# Patient Record
Sex: FEMALE | Race: WHITE | Employment: PART TIME | URBAN - METROPOLITAN AREA
[De-identification: names, ages, dates, MRNs, and addresses within clinical notes are randomized per-mention and may not be internally consistent; named-entity substitution may affect disease eponyms.]

---

## 2019-02-15 ENCOUNTER — OFFICE VISIT (OUTPATIENT)
Dept: PRIMARY CARE CLINIC | Age: 59
End: 2019-02-15
Payer: COMMERCIAL

## 2019-02-15 VITALS
OXYGEN SATURATION: 99 % | HEIGHT: 62 IN | SYSTOLIC BLOOD PRESSURE: 116 MMHG | BODY MASS INDEX: 31.76 KG/M2 | HEART RATE: 69 BPM | WEIGHT: 172.6 LBS | DIASTOLIC BLOOD PRESSURE: 80 MMHG

## 2019-02-15 DIAGNOSIS — Z12.39 BREAST CANCER SCREENING: ICD-10-CM

## 2019-02-15 DIAGNOSIS — Z00.00 ENCOUNTER FOR WELLNESS EXAMINATION: Primary | ICD-10-CM

## 2019-02-15 DIAGNOSIS — E66.09 CLASS 1 OBESITY DUE TO EXCESS CALORIES WITHOUT SERIOUS COMORBIDITY WITH BODY MASS INDEX (BMI) OF 31.0 TO 31.9 IN ADULT: ICD-10-CM

## 2019-02-15 PROCEDURE — 99396 PREV VISIT EST AGE 40-64: CPT | Performed by: INTERNAL MEDICINE

## 2019-02-15 ASSESSMENT — PATIENT HEALTH QUESTIONNAIRE - PHQ9
SUM OF ALL RESPONSES TO PHQ QUESTIONS 1-9: 0
2. FEELING DOWN, DEPRESSED OR HOPELESS: 0
SUM OF ALL RESPONSES TO PHQ QUESTIONS 1-9: 0
1. LITTLE INTEREST OR PLEASURE IN DOING THINGS: 0
SUM OF ALL RESPONSES TO PHQ9 QUESTIONS 1 & 2: 0

## 2019-02-15 ASSESSMENT — ENCOUNTER SYMPTOMS
VOMITING: 0
SINUS PRESSURE: 0
CONSTIPATION: 0
COUGH: 0
ABDOMINAL DISTENTION: 0
BACK PAIN: 0
NAUSEA: 0
SHORTNESS OF BREATH: 0
WHEEZING: 0
ABDOMINAL PAIN: 0
SINUS PAIN: 0
DIARRHEA: 0

## 2019-05-14 ENCOUNTER — TELEPHONE (OUTPATIENT)
Dept: PRIMARY CARE CLINIC | Age: 59
End: 2019-05-14

## 2019-05-14 NOTE — TELEPHONE ENCOUNTER
Telephone Outcome: Other - patient's insurance does not cover mammograms. I gave her the number to [facility] to discuss with them options she might have. Isaías Nguyen

## 2019-05-15 ENCOUNTER — OFFICE VISIT (OUTPATIENT)
Dept: PRIMARY CARE CLINIC | Age: 59
End: 2019-05-15
Payer: COMMERCIAL

## 2019-05-15 ENCOUNTER — HOSPITAL ENCOUNTER (OUTPATIENT)
Age: 59
Setting detail: SPECIMEN
Discharge: HOME | End: 2019-05-15
Payer: COMMERCIAL

## 2019-05-15 VITALS
HEART RATE: 61 BPM | BODY MASS INDEX: 31.83 KG/M2 | DIASTOLIC BLOOD PRESSURE: 68 MMHG | OXYGEN SATURATION: 98 % | WEIGHT: 173 LBS | HEIGHT: 62 IN | SYSTOLIC BLOOD PRESSURE: 114 MMHG

## 2019-05-15 DIAGNOSIS — N89.8 VAGINAL DRYNESS: ICD-10-CM

## 2019-05-15 DIAGNOSIS — Z01.419 WELL WOMAN EXAM: Primary | ICD-10-CM

## 2019-05-15 RX ORDER — ESTRADIOL 0.1 MG/G
1 CREAM VAGINAL DAILY
Qty: 1 TUBE | Refills: 3 | Status: SHIPPED | OUTPATIENT
Start: 2019-05-15 | End: 2019-12-18 | Stop reason: ALTCHOICE

## 2019-05-15 ASSESSMENT — PATIENT HEALTH QUESTIONNAIRE - PHQ9
SUM OF ALL RESPONSES TO PHQ QUESTIONS 1-9: 0
1. LITTLE INTEREST OR PLEASURE IN DOING THINGS: 0
2. FEELING DOWN, DEPRESSED OR HOPELESS: 0
SUM OF ALL RESPONSES TO PHQ QUESTIONS 1-9: 0
SUM OF ALL RESPONSES TO PHQ9 QUESTIONS 1 & 2: 0

## 2019-05-15 NOTE — PROGRESS NOTES
[facility]  [address] [place] [address] Dr  [address]  [place] [address]  Dept: [phone]  Dept Fax: [phone]    Carlos A Ontiveros is a 60 y.o. female who presents today for her medical conditions/complaints as noted below. Chief Complaint   Patient presents with    Gynecologic Exam       HPI:     This is a 69-year-old female who is here for a Pap smear. Ileus Pap smear was normal.  She does not have any abnormal discharge or any other abnormal bleeding. She is postmenopausal.      No results found for: LABA1C          ( goal A1C is < 7)   No results found for: LABMICR  LDL Cholesterol (mg/dL)   Date Value   03/15/2016 106   2014 103   2011 98       (goal LDL is <100)   AST (U/L)   Date Value   03/15/2016 14     ALT (U/L)   Date Value   03/15/2016 14     BUN (mg/dL)   Date Value   2016 12     BP Readings from Last 3 Encounters:   05/15/19 114/68   02/15/19 116/80   16 125/67          (goal 120/80)    Past Medical History:   Diagnosis Date    Dermatitis     MVP (mitral valve prolapse)     Wears glasses       Past Surgical History:   Procedure Laterality Date     SECTION      TONSILLECTOMY AND ADENOIDECTOMY      VAGINOPLASTY         Family History   Problem Relation Age of Onset    Heart Disease Mother     Lupus Mother     Lung Cancer Father     Hypertension Father     Emphysema Father     Lupus Sister     Hypertension Brother     COPD Brother     Diabetes Brother     Stroke Maternal Grandmother        Social History     Tobacco Use    Smoking status: Never Smoker    Smokeless tobacco: Never Used   Substance Use Topics    Alcohol use:  Yes     Alcohol/week: 3.6 oz     Types: 6 Standard drinks or equivalent per week      Current Outpatient Medications   Medication Sig Dispense Refill    estradiol (ESTRACE VAGINAL) 0.1 MG/GM vaginal cream Place 1 g vaginally daily 1 Tube 3    Ibuprofen (ADVIL PO) Take 600 mg by mouth 2 times daily.  MOVIPREP 100 G solution   0     No current facility-administered medications for this visit. Allergies   Allergen Reactions    Demerol Hcl [Meperidine]     Tetracyclines & Related        Health Maintenance   Topic Date Due    Shingles Vaccine (1 of 2) 05/02/2009    Breast cancer screen  02/03/2018    Cervical cancer screen  05/01/2018    Diabetes screen  05/15/2020 (Originally 5/2/1999)    DTaP/Tdap/Td vaccine (2 - Tdap) 10/01/2020    Lipid screen  03/15/2021    Colon cancer screen colonoscopy  05/14/2024    Flu vaccine  Completed    Pneumococcal 0-64 years Vaccine  Aged Out    Hepatitis C screen  Discontinued    HIV screen  Discontinued       Subjective:      Review of Systems   Constitutional: Negative for activity change, appetite change, chills, fatigue and fever. HENT: Negative for congestion, ear pain, hearing loss, nosebleeds, sinus pressure, sinus pain and sneezing. Eyes: Negative for visual disturbance. Respiratory: Negative for cough, shortness of breath and wheezing. Cardiovascular: Negative for chest pain and palpitations. Gastrointestinal: Negative for abdominal distention, abdominal pain, constipation, diarrhea, nausea and vomiting. Endocrine: Negative for cold intolerance, heat intolerance, polydipsia, polyphagia and polyuria. Genitourinary: Negative for difficulty urinating, menstrual problem, vaginal bleeding and vaginal discharge. Musculoskeletal: Negative for back pain and joint swelling. Skin: Negative for rash. Neurological: Negative for numbness and headaches. Psychiatric/Behavioral: Negative for sleep disturbance. The patient is not nervous/anxious. Objective:     Physical Exam   Constitutional: She is oriented to person, place, and time. Vital signs are normal. She appears well-developed and well-nourished. She is active. No distress. HENT:   Head: Normocephalic and atraumatic.    Right Ear: Hearing normal.   Left Ear: Hearing normal.   Mouth/Throat: Uvula is midline, oropharynx is clear and moist and mucous membranes are normal.   Eyes: Pupils are equal, round, and reactive to light. Conjunctivae are normal. No scleral icterus. Neck: Normal range of motion, full passive range of motion without pain and phonation normal. Neck supple. No JVD present. No thyroid mass and no thyromegaly present. Cardiovascular: Normal rate, regular rhythm, normal heart sounds and intact distal pulses. Exam reveals no decreased pulses. No murmur heard. Pulses:       Carotid pulses are 2+ on the right side, and 2+ on the left side. Radial pulses are 2+ on the right side, and 2+ on the left side. Pulmonary/Chest: Effort normal and breath sounds normal. No accessory muscle usage. No apnea. No respiratory distress. She has no wheezes. She has no rales. Abdominal: Soft. Bowel sounds are normal. She exhibits no distension. There is no tenderness. Genitourinary: Vagina normal. Cervix exhibits no motion tenderness, no discharge and no friability. Right adnexum displays no mass, no tenderness and no fullness. Left adnexum displays no mass, no tenderness and no fullness. Musculoskeletal: Normal range of motion. She exhibits no edema or deformity. Lymphadenopathy:     She has no cervical adenopathy. Neurological: She is alert and oriented to person, place, and time. She displays normal reflexes. Skin: Skin is warm and intact. Capillary refill takes less than 2 seconds. No rash noted. She is not diaphoretic. Psychiatric: She has a normal mood and affect. Her behavior is normal. Cognition and memory are normal.   Nursing note and vitals reviewed. /68   Pulse 61   Ht 5' 2\" (1.575 m)   Wt 173 lb (78.5 kg)   LMP 01/01/1999 (Approximate)   SpO2 98%   BMI 31.64 kg/m²     Assessment:          1. Well woman exam  - PAP SMEAR; Future    2.  Vaginal dryness  - estradiol (ESTRACE VAGINAL) 0.1 MG/GM vaginal cream; Place 1 g vaginally daily  Dispense: 1 Tube; Refill: 3            Diagnosis Orders   1. Well woman exam  PAP SMEAR   2. Vaginal dryness  estradiol (ESTRACE VAGINAL) 0.1 MG/GM vaginal cream           Plan:      No follow-ups on file. Orders Placed This Encounter   Procedures    PAP SMEAR     Patient History:    Patient's last menstrual period was 1999 (approximate). OBGYN Status: Postmenopausal  Past Surgical History:  No date:  SECTION  No date: TONSILLECTOMY AND ADENOIDECTOMY  No date: VAGINOPLASTY      Social History    Tobacco Use      Smoking status: Never Smoker      Smokeless tobacco: Never Used       Standing Status:   Future     Standing Expiration Date:   2020     Order Specific Question:   Collection Type     Answer: Thin Prep     Order Specific Question:   Prior Abnormal Pap Test     Answer:   No     Order Specific Question:   Screening or Diagnostic     Answer:   Screening     Order Specific Question:   HPV Requested? Answer:   Yes -  If ASCUS Reflex HPV     Order Specific Question:   High Risk Patient     Answer:   N/A     Orders Placed This Encounter   Medications    estradiol (ESTRACE VAGINAL) 0.1 MG/GM vaginal cream     Sig: Place 1 g vaginally daily     Dispense:  1 Tube     Refill:  3         Patient given educational materials - see patient instructions. Discussed use, benefit, and side effects of prescribedmedications. All patient questions answered. Pt voiced understanding. Reviewed health maintenance. Instructed to continue current medications, diet and exercise. Patient agreed with treatment plan. Follow up as directed. Of the given duration appointment visit, Dr. Lito Chaudhari MD  spent at least 50% of the face-to-face time in counseling, explanation of diagnosis, planning of further management, and answering all questions.       Electronically signed by Lito Chaudhari MD on 2019 at 10:44 AM      Please note that this chart was generated using voice recognition Dragon dictation software. Although every effort was made to ensure the accuracy of this automatedtranscription, some errors in transcription may have occurred.

## 2019-05-17 ASSESSMENT — ENCOUNTER SYMPTOMS
ABDOMINAL DISTENTION: 0
COUGH: 0
SINUS PAIN: 0
BACK PAIN: 0
SINUS PRESSURE: 0
WHEEZING: 0
SHORTNESS OF BREATH: 0
ABDOMINAL PAIN: 0
CONSTIPATION: 0
DIARRHEA: 0
NAUSEA: 0
VOMITING: 0

## 2019-05-20 LAB — CYTOLOGY REPORT: NORMAL

## 2019-07-18 ENCOUNTER — TELEPHONE (OUTPATIENT)
Dept: PRIMARY CARE CLINIC | Age: 59
End: 2019-07-18

## 2019-08-09 ENCOUNTER — TELEPHONE (OUTPATIENT)
Dept: SURGERY | Age: 59
End: 2019-08-09

## 2019-12-18 ENCOUNTER — OFFICE VISIT (OUTPATIENT)
Dept: PRIMARY CARE CLINIC | Age: 59
End: 2019-12-18
Payer: COMMERCIAL

## 2019-12-18 VITALS
SYSTOLIC BLOOD PRESSURE: 136 MMHG | HEART RATE: 69 BPM | OXYGEN SATURATION: 99 % | WEIGHT: 174.6 LBS | BODY MASS INDEX: 31.93 KG/M2 | RESPIRATION RATE: 16 BRPM | DIASTOLIC BLOOD PRESSURE: 80 MMHG

## 2019-12-18 DIAGNOSIS — Z00.00 ANNUAL PHYSICAL EXAM: Primary | ICD-10-CM

## 2019-12-18 DIAGNOSIS — I45.9 SKIPPED HEART BEATS: ICD-10-CM

## 2019-12-18 DIAGNOSIS — E66.09 CLASS 1 OBESITY DUE TO EXCESS CALORIES WITHOUT SERIOUS COMORBIDITY WITH BODY MASS INDEX (BMI) OF 31.0 TO 31.9 IN ADULT: ICD-10-CM

## 2019-12-18 DIAGNOSIS — I34.1 MVP (MITRAL VALVE PROLAPSE): ICD-10-CM

## 2019-12-18 PROCEDURE — 99999 EKG 12-LEAD: CPT | Performed by: INTERNAL MEDICINE

## 2019-12-18 PROCEDURE — 99396 PREV VISIT EST AGE 40-64: CPT | Performed by: INTERNAL MEDICINE

## 2019-12-22 ASSESSMENT — ENCOUNTER SYMPTOMS
ABDOMINAL DISTENTION: 0
BACK PAIN: 0
CONSTIPATION: 0
SHORTNESS OF BREATH: 0
COUGH: 0
SINUS PAIN: 0
NAUSEA: 0
ABDOMINAL PAIN: 0
VOMITING: 0
SINUS PRESSURE: 0
WHEEZING: 0
DIARRHEA: 0

## 2020-01-06 ENCOUNTER — HOSPITAL ENCOUNTER (OUTPATIENT)
Age: 60
Discharge: HOME | End: 2020-01-06
Payer: COMMERCIAL

## 2020-01-06 ENCOUNTER — HOSPITAL ENCOUNTER (OUTPATIENT)
Dept: NON INVASIVE DIAGNOSTICS | Age: 60
Discharge: HOME | End: 2020-01-06
Payer: COMMERCIAL

## 2020-01-06 LAB
ABSOLUTE EOS #: 0.2 K/UL (ref 0–0.4)
ABSOLUTE IMMATURE GRANULOCYTE: ABNORMAL K/UL (ref 0–0.3)
ABSOLUTE LYMPH #: 1 K/UL (ref 1–4.8)
ABSOLUTE MONO #: 0.4 K/UL (ref 0.1–1.3)
ALBUMIN SERPL-MCNC: 4.1 G/DL (ref 3.5–5.2)
ALBUMIN/GLOBULIN RATIO: ABNORMAL (ref 1–2.5)
ALP BLD-CCNC: 66 U/L (ref 35–104)
ALT SERPL-CCNC: 11 U/L (ref 5–33)
ANION GAP SERPL CALCULATED.3IONS-SCNC: 9 MMOL/L (ref 9–17)
AST SERPL-CCNC: 13 U/L
BASOPHILS # BLD: 1 % (ref 0–2)
BASOPHILS ABSOLUTE: 0 K/UL (ref 0–0.2)
BILIRUB SERPL-MCNC: 0.27 MG/DL (ref 0.3–1.2)
BUN BLDV-MCNC: 16 MG/DL (ref 8–23)
BUN/CREAT BLD: ABNORMAL (ref 9–20)
CALCIUM SERPL-MCNC: 9.7 MG/DL (ref 8.6–10.4)
CHLORIDE BLD-SCNC: 110 MMOL/L (ref 98–107)
CHOLESTEROL/HDL RATIO: 3.3
CHOLESTEROL: 140 MG/DL
CO2: 23 MMOL/L (ref 20–31)
CREAT SERPL-MCNC: 0.59 MG/DL (ref 0.5–0.9)
DIFFERENTIAL TYPE: ABNORMAL
EOSINOPHILS RELATIVE PERCENT: 4 % (ref 0–4)
ESTIMATED AVERAGE GLUCOSE: 114 MG/DL
FOLATE: 13 NG/ML
GFR AFRICAN AMERICAN: >60 ML/MIN
GFR NON-AFRICAN AMERICAN: >60 ML/MIN
GFR SERPL CREATININE-BSD FRML MDRD: ABNORMAL ML/MIN/{1.73_M2}
GFR SERPL CREATININE-BSD FRML MDRD: ABNORMAL ML/MIN/{1.73_M2}
GLUCOSE BLD-MCNC: 97 MG/DL (ref 70–99)
HBA1C MFR BLD: 5.6 % (ref 4–6)
HCT VFR BLD CALC: 40.2 % (ref 36–46)
HDLC SERPL-MCNC: 43 MG/DL
HEMOGLOBIN: 13.5 G/DL (ref 12–16)
IMMATURE GRANULOCYTES: ABNORMAL %
LDL CHOLESTEROL: 83 MG/DL (ref 0–130)
LV EF: 63 %
LVEF MODALITY: NORMAL
LYMPHOCYTES # BLD: 23 % (ref 24–44)
MCH RBC QN AUTO: 30.2 PG (ref 26–34)
MCHC RBC AUTO-ENTMCNC: 33.5 G/DL (ref 31–37)
MCV RBC AUTO: 90 FL (ref 80–100)
MONOCYTES # BLD: 10 % (ref 1–7)
NRBC AUTOMATED: ABNORMAL PER 100 WBC
PDW BLD-RTO: 13.3 % (ref 11.5–14.9)
PLATELET # BLD: 222 K/UL (ref 150–450)
PLATELET ESTIMATE: ABNORMAL
PMV BLD AUTO: 9 FL (ref 6–12)
POTASSIUM SERPL-SCNC: 4.3 MMOL/L (ref 3.7–5.3)
RBC # BLD: 4.47 M/UL (ref 4–5.2)
RBC # BLD: ABNORMAL 10*6/UL
SEG NEUTROPHILS: 62 % (ref 36–66)
SEGMENTED NEUTROPHILS ABSOLUTE COUNT: 2.6 K/UL (ref 1.3–9.1)
SODIUM BLD-SCNC: 142 MMOL/L (ref 135–144)
TOTAL PROTEIN: 6.8 G/DL (ref 6.4–8.3)
TRIGL SERPL-MCNC: 70 MG/DL
TSH SERPL DL<=0.05 MIU/L-ACNC: 1.08 MIU/L (ref 0.3–5)
VITAMIN B-12: 233 PG/ML (ref 232–1245)
VITAMIN D 25-HYDROXY: 17.5 NG/ML (ref 30–100)
VLDLC SERPL CALC-MCNC: NORMAL MG/DL (ref 1–30)
WBC # BLD: 4.2 K/UL (ref 3.5–11)
WBC # BLD: ABNORMAL 10*3/UL

## 2020-01-06 PROCEDURE — 83036 HEMOGLOBIN GLYCOSYLATED A1C: CPT

## 2020-01-06 PROCEDURE — 85025 COMPLETE CBC W/AUTO DIFF WBC: CPT

## 2020-01-06 PROCEDURE — 36415 COLL VENOUS BLD VENIPUNCTURE: CPT

## 2020-01-06 PROCEDURE — 82746 ASSAY OF FOLIC ACID SERUM: CPT

## 2020-01-06 PROCEDURE — 84443 ASSAY THYROID STIM HORMONE: CPT

## 2020-01-06 PROCEDURE — 82607 VITAMIN B-12: CPT

## 2020-01-06 PROCEDURE — 82306 VITAMIN D 25 HYDROXY: CPT

## 2020-01-06 PROCEDURE — 93306 TTE W/DOPPLER COMPLETE: CPT

## 2020-01-06 PROCEDURE — 80061 LIPID PANEL: CPT

## 2020-01-06 PROCEDURE — 80053 COMPREHEN METABOLIC PANEL: CPT

## 2020-01-07 RX ORDER — ERGOCALCIFEROL 1.25 MG/1
50000 CAPSULE ORAL WEEKLY
Qty: 12 CAPSULE | Refills: 1 | Status: SHIPPED | OUTPATIENT
Start: 2020-01-07 | End: 2022-07-12

## 2020-07-07 ENCOUNTER — OFFICE VISIT (OUTPATIENT)
Dept: PRIMARY CARE CLINIC | Age: 60
End: 2020-07-07
Payer: COMMERCIAL

## 2020-07-07 VITALS
DIASTOLIC BLOOD PRESSURE: 72 MMHG | TEMPERATURE: 98.4 F | SYSTOLIC BLOOD PRESSURE: 122 MMHG | BODY MASS INDEX: 31.1 KG/M2 | WEIGHT: 169 LBS | HEIGHT: 62 IN | OXYGEN SATURATION: 98 % | RESPIRATION RATE: 16 BRPM | HEART RATE: 67 BPM

## 2020-07-07 PROCEDURE — 99213 OFFICE O/P EST LOW 20 MIN: CPT | Performed by: INTERNAL MEDICINE

## 2020-07-07 RX ORDER — ACETAMINOPHEN 500 MG
1000 TABLET ORAL 2 TIMES DAILY PRN
COMMUNITY
End: 2022-07-12

## 2020-07-07 ASSESSMENT — PATIENT HEALTH QUESTIONNAIRE - PHQ9
SUM OF ALL RESPONSES TO PHQ QUESTIONS 1-9: 0
SUM OF ALL RESPONSES TO PHQ9 QUESTIONS 1 & 2: 0
1. LITTLE INTEREST OR PLEASURE IN DOING THINGS: 0
SUM OF ALL RESPONSES TO PHQ QUESTIONS 1-9: 0
2. FEELING DOWN, DEPRESSED OR HOPELESS: 0

## 2020-07-15 ENCOUNTER — NURSE ONLY (OUTPATIENT)
Dept: PRIMARY CARE CLINIC | Age: 60
End: 2020-07-15
Payer: COMMERCIAL

## 2020-07-15 PROCEDURE — 96372 THER/PROPH/DIAG INJ SC/IM: CPT | Performed by: INTERNAL MEDICINE

## 2020-07-15 RX ORDER — CYANOCOBALAMIN 1000 UG/ML
1000 INJECTION INTRAMUSCULAR; SUBCUTANEOUS ONCE
Status: COMPLETED | OUTPATIENT
Start: 2020-07-15 | End: 2020-07-15

## 2020-07-15 RX ADMIN — CYANOCOBALAMIN 1000 MCG: 1000 INJECTION INTRAMUSCULAR; SUBCUTANEOUS at 08:17

## 2020-07-15 NOTE — PROGRESS NOTES
After obtaining consent, and per orders of Dr. Swetha Avina, injection of B12 given in Left deltoid by Wojciech Soler. Patient instructed to report any adverse reaction to the office immediately. Patient tolerated injection & left the office a few minutes later feeling well. Double checked correct medication was to be given with DUKE Esparza.

## 2020-07-21 ASSESSMENT — ENCOUNTER SYMPTOMS
CONSTIPATION: 0
WHEEZING: 0
DIARRHEA: 0
SINUS PAIN: 0
SINUS PRESSURE: 0
ABDOMINAL PAIN: 0
SHORTNESS OF BREATH: 0
NAUSEA: 0
ABDOMINAL DISTENTION: 0
COUGH: 0
BACK PAIN: 0
VOMITING: 0

## 2020-07-21 NOTE — PROGRESS NOTES
[address] PRIMARY CARE  [address] [facility]  [address] [address]  Dept: [phone]  Dept Fax: [phone]    Sheri Jolly is a 63 y.o. female who presents today for her medical conditions/complaints as noted below. Chief Complaint   Patient presents with    Ankle Pain     right sided, x 1.5 years       HPI:     This is a 69-year-old female who is here for chronic pain of her right ankle which has been there for many years about 1.5 years. She had injury to that ankle few years ago. Advised her to take NSAIDs and do physical therapy. No other complaints or concerns      Hemoglobin A1C (%)   Date Value   2020 5.6             ( goal A1C is < 7)   No results found for: LABMICR  LDL Cholesterol (mg/dL)   Date Value   2020 83   03/15/2016 106   2014 103       (goal LDL is <100)   AST (U/L)   Date Value   2020 13     ALT (U/L)   Date Value   2020 11     BUN (mg/dL)   Date Value   2020 16     BP Readings from Last 3 Encounters:   20 122/72   19 136/80   05/15/19 114/68          (goal 120/80)    Past Medical History:   Diagnosis Date    Dermatitis     MVP (mitral valve prolapse)     Wears glasses       Past Surgical History:   Procedure Laterality Date     SECTION      TONSILLECTOMY AND ADENOIDECTOMY      VAGINOPLASTY         Family History   Problem Relation Age of Onset    Heart Disease Mother     Lupus Mother     Lung Cancer Father     Hypertension Father     Emphysema Father     Lupus Sister     Hypertension Brother     COPD Brother     Diabetes Brother     Stroke Maternal Grandmother        Social History     Tobacco Use    Smoking status: Never Smoker    Smokeless tobacco: Never Used   Substance Use Topics    Alcohol use:  Yes     Alcohol/week: 6.0 standard drinks     Types: 6 Standard drinks or equivalent per week      Current Outpatient Medications   Medication Sig Dispense Refill nervous/anxious. All other systems reviewed and are negative. Objective:     Physical Exam  Vitals signs and nursing note reviewed. Constitutional:       General: She is not in acute distress. Appearance: She is well-developed. She is not diaphoretic. HENT:      Head: Normocephalic and atraumatic. Right Ear: Hearing normal.      Left Ear: Hearing normal.      Mouth/Throat:      Pharynx: Uvula midline. Eyes:      General: No scleral icterus. Conjunctiva/sclera: Conjunctivae normal.      Pupils: Pupils are equal, round, and reactive to light. Neck:      Musculoskeletal: Full passive range of motion without pain, normal range of motion and neck supple. Thyroid: No thyroid mass or thyromegaly. Vascular: No JVD. Trachea: Phonation normal.   Cardiovascular:      Rate and Rhythm: Normal rate and regular rhythm. Pulses: No decreased pulses. Carotid pulses are 2+ on the right side and 2+ on the left side. Radial pulses are 2+ on the right side and 2+ on the left side. Heart sounds: Normal heart sounds. No murmur. Pulmonary:      Effort: Pulmonary effort is normal. No accessory muscle usage or respiratory distress. Breath sounds: Normal breath sounds. No wheezing or rales. Abdominal:      General: Bowel sounds are normal. There is no distension. Palpations: Abdomen is soft. Tenderness: There is no abdominal tenderness. Musculoskeletal: Normal range of motion. General: No swelling, tenderness or deformity. Lymphadenopathy:      Cervical: No cervical adenopathy. Skin:     General: Skin is warm. Capillary Refill: Capillary refill takes less than 2 seconds. Findings: No rash. Neurological:      Mental Status: She is alert and oriented to person, place, and time.       Deep Tendon Reflexes: Reflexes normal.   Psychiatric:         Behavior: Behavior normal.       /72   Pulse 67   Temp 98.4 °F (36.9 °C) (Temporal)   Resp 16   Ht 5' 2\" (1.575 m)   Wt 169 lb (76.7 kg)   LMP 01/01/1999 (Approximate)   SpO2 98%   BMI 30.91 kg/m²     Assessment:          1. Chronic pain of right ankle  NSAIDs Tylenol thousand milligrams up to 3 times a day  - Ambulatory referral to Physical Therapy            Diagnosis Orders   1. Chronic pain of right ankle  Ambulatory referral to Physical Therapy           Plan:      No follow-ups on file. Orders Placed This Encounter   Procedures    Ambulatory referral to Physical Therapy     Referral Priority:   Routine     Referral Type:   Eval and Treat     Referral Reason:   Specialty Services Required     Requested Specialty:   Physical Therapy     Number of Visits Requested:   1     No orders of the defined types were placed in this encounter. Patient given educational materials - see patient instructions. Discussed use, benefit, and side effects of prescribedmedications. All patient questions answered. Pt voiced understanding. Reviewed health maintenance. Instructed to continue current medications, diet and exercise. Patient agreed with treatment plan. Follow up as directed. I spent a total of 15 minutes face to face with this patient. Over 50% of that time was spent on counseling and care coordination. Please see assessment and plan for details. Electronically signed by Marshall Matthews MD on 7/21/2020 at 6:31 PM      Please note that this chart was generated using voice recognition Dragon dictation software. Although every effort was made to ensure the accuracy of this automatedtranscription, some errors in transcription may have occurred.

## 2020-07-22 ENCOUNTER — NURSE ONLY (OUTPATIENT)
Dept: PRIMARY CARE CLINIC | Age: 60
End: 2020-07-22
Payer: COMMERCIAL

## 2020-07-22 PROCEDURE — 96372 THER/PROPH/DIAG INJ SC/IM: CPT | Performed by: INTERNAL MEDICINE

## 2020-07-22 RX ORDER — CYANOCOBALAMIN 1000 UG/ML
1000 INJECTION INTRAMUSCULAR; SUBCUTANEOUS ONCE
Status: COMPLETED | OUTPATIENT
Start: 2020-07-22 | End: 2020-07-22

## 2020-07-22 RX ADMIN — CYANOCOBALAMIN 1000 MCG: 1000 INJECTION INTRAMUSCULAR; SUBCUTANEOUS at 08:23

## 2020-07-27 ENCOUNTER — HOSPITAL ENCOUNTER (OUTPATIENT)
Dept: PHYSICAL THERAPY | Facility: CLINIC | Age: 60
Setting detail: THERAPIES SERIES
Discharge: HOME | End: 2020-07-27
Payer: COMMERCIAL

## 2020-07-27 PROCEDURE — 97110 THERAPEUTIC EXERCISES: CPT

## 2020-07-27 PROCEDURE — 97161 PT EVAL LOW COMPLEX 20 MIN: CPT

## 2020-07-27 NOTE — CONSULTS
tight Right tight   Hip flexor [] [x] [x]   quad [] [x] [x]   HS [] [] []   piriformis [] [x] [x]   ITB [] [] []   gastroc [] [x] [x]   Soleus  [] [x] [x]   Posterior Tib [] [] [x]   Peroneals [] [] []      TTP   Anterior talus, mild sinus tarsi, pos tib, talar navicular jt and talar calcaneal jt   Callus Pattern   Lateral met   Sensation [] []    Edema [] []    Neurological [] []     [] []     [] []    Gait [] [] Analysis: see above         TESTS (+/-) Left Right Not Tested   Ant. Drawer   [x]   Post. Drawer   [x]   Varus stress    [x]   Valgus Stress    [x]   Tinel's Sign   [x]   Talor Tilt   [x]   Syndesmosis Squeeze Test   [x]     Foot/Ankle Mobility  Left  Right    Talocrural Jt wfl hypo    Subtalar Jt EV IV    1st MTP Jt       Forefoot  Mild hypo    Midfoot           FUNCTION Normal Difficult Unable   Sitting [] [] []   Standing [] [x] []   Ambulation [] [x] []   Groom/Dress [] [] []   Lift/Carry [] [] []   Stairs [] [x] []   Bending [] [] []   Squat [] [] []   Kneel [] [] []     Comments:Patient presents to physical therapy c/o R anterior ankle pain. Patient demonstrates gastroc equinus,with intrinsic mm weakness, and compensatory foot mechanics while ambulating, limited hip ROM and glute medius weakness      Assessment:      STG: (to be met in 9 treatments)  1. ? Pain: Patient to report 0/10 pain with WB  2. ? ROM: Patient to demonstrate 0 degrees R TCJ DF with knee extended  3. ? Strength: Patient to demonstrate the ability to double heel raise with neutral pattern  4. ? Function: Patient to demonstrate the ability to ambulate with neutral gait pattern push off through great toe   5. Independent with Home Exercise Programs  6.  Demonstrate Knowledge of fall prevention              Patient goals: to eliminate foot pain    Rehab Potential:  [x] Good  [] Fair  [] Poor   Suggested Professional Referral:  [x] No  [] Yes:  Barriers to Goal Achievement[de-identified]  [x] No  [] Yes:  Domestic Concerns:  [x] No  [] Yes:    Pt. Education:  [x] Plans/Goals, Risks/Benefits discussed  [x] Home exercise program    Method of Education: [] Verbal  [] Demo  [] Written  Comprehension of Education:  [] Verbalizes understanding. [] Demonstrates understanding. [] Needs Review. [] Demonstrates/verbalizes understanding of HEP/Ed previously given. Treatment Plan:  [x] Therapeutic Exercise    [] Aquatic Therapy   [x] Manual Therapy     [] Electrical Stimulation  [x] Instruction in HEP      [] Lumbar/Cervical Traction  [x] Neuromuscular Re-education [] Cold/hotpack  [] Iontophoresis: 4 mg/mL  [x] Vasocompression (GameReady)                    Dexamethasone Sodium  [x] Gait Training             Phosphate 40-80 mAmin         []  Medication allergies reviewed for use of    Dexamethasone Sodium Phosphate 4mg/ml     with iontophoresis treatments. Pt is not allergic.     Frequency:  1 x/week for 9 visits    Todays Treatment:    Exercises:  Exercise     Reps/ Time Weight/ Level Comments   Posture edu      Static gastroc stretch      Dynamic gastroc stretch                        Other:    Specific Instructions for next treatment: STR to gastroc TrP 1&2, posterior tib, talar MOBs I/s in short foot, toe yoga, bilateral heel raise, review HEP    Evaluation Complexity:  History (Personal factors, comorbidities) [x] 0 [] 1-2 [] 3+   Exam (limitations, restrictions) [x] 1-2 [] 3 [] 4+   Clinical presentation (progression) [x] Stable [] Evolving  [] Unstable   Decision Making [x] Low [] Moderate [] High    [x] Low Complexity [] Moderate Complexity [] High Complexity       Treatment Charges: Mins Units   [x] Evaluation       [x]  Low       []  Moderate       []  High 40 1   []  Modalities     [x]  Ther Exercise 10 1   []  Manual Therapy     []  Ther Activities     []  Aquatics     []  Vasocompression     []  Other       TOTAL TREATMENT TIME: 50    Time in:610   Time Out:700    Electronically signed by: Mee Ahumada, PT        Physician Signature:________________________________Date:__________________  By signing above or cosigning this note, I have reviewed this plan of care and certify a need for medically necessary rehabilitation services.      *PLEASE SIGN ABOVE AND FAX BACK ALL PAGES*

## 2020-07-29 ENCOUNTER — NURSE ONLY (OUTPATIENT)
Dept: PRIMARY CARE CLINIC | Age: 60
End: 2020-07-29
Payer: COMMERCIAL

## 2020-07-29 PROCEDURE — 96372 THER/PROPH/DIAG INJ SC/IM: CPT | Performed by: INTERNAL MEDICINE

## 2020-07-29 RX ORDER — CYANOCOBALAMIN 1000 UG/ML
1000 INJECTION INTRAMUSCULAR; SUBCUTANEOUS ONCE
Status: COMPLETED | OUTPATIENT
Start: 2020-07-29 | End: 2020-07-29

## 2020-07-29 RX ADMIN — CYANOCOBALAMIN 1000 MCG: 1000 INJECTION INTRAMUSCULAR; SUBCUTANEOUS at 08:23

## 2020-07-29 NOTE — PROGRESS NOTES
After obtaining consent, and per orders of Dr. Yanet Bedolla MD, injection of B12 given in Left deltoid by Jyotsna See CMA. Patient instructed to remain in clinic for 20 minutes afterwards, and to report any adverse reaction to me immediately. Patient tolerated injection well.

## 2020-08-04 ENCOUNTER — HOSPITAL ENCOUNTER (OUTPATIENT)
Dept: PHYSICAL THERAPY | Facility: CLINIC | Age: 60
Setting detail: THERAPIES SERIES
Discharge: HOME | End: 2020-08-04
Payer: COMMERCIAL

## 2020-08-04 PROCEDURE — 97110 THERAPEUTIC EXERCISES: CPT

## 2020-08-04 PROCEDURE — 97140 MANUAL THERAPY 1/> REGIONS: CPT

## 2020-08-04 NOTE — FLOWSHEET NOTE
[] [address]  [address]  [phone]  F: [phone] [x] [address]  [facility]   [address]  P: [phone]  F: [phone] [] [place]  [address]  P: [phone]  F: [phone] [] [address]  [facility]   [address]   [place]: [phone]  F: [phone]      Physical Therapy Daily Treatment Note    Date:  2020  Patient Name:  Eleni Humphreys    :  1960  MRN: [id number]  Physician: Ivan Webster MD                                    Insurance: [address] [place] [address] Diagnosis: M25.571, G89.29                      Rehab Codes: M25.571  Onset date: 2018                       Next Dr's appt. : tbd  Visit# / total visits: 2/9    Cancels/No Shows: 0/0    Subjective:    Pain:  [x] Yes  [] No Location: R Anterior  TCJ Pain Rating: (0-10 scale) 2-3/10 avg, 0/10 currently  Pain altered Tx:  [] No  [] Yes  Action:  Comments: Pt arrives to [facility] clinic after being evaluated in \[facility]"; states this is closer to her work. states she has been completing the stretches, hasn't noted any improvement yet. Pt went hiking yesterday in [facility] and had 8/10 pain. Objective:  Modalities:   Precautions:  Exercises:  Exercise       Reps/ Time Weight/ Level Comments   IASTM R gastroc and achilles  10 min       Talar mobilization  3 min     Belt assisted DF mob 5 min           Static gastroc stretch 5x30\"       Dynamic gastroc stretch 20x                 Foot dome  20x5\"     Toe yoga  10x       Heel raise  20x5\" 10#                  Other:     Specific Instructions for next treatment: posterior tib strengthening, continue mobilizations for DF.  ADD clams and SL hip abd      Treatment Charges: Mins Units   []  Modalities     [x]  Ther Exercise 25 2   [x]  Manual Therapy 18 1   []  Ther Activities     []  Aquatics     []  Vasocompression     []  Other     Total Treatment time 43 3       Assessment: [x] Progressing toward goals. Briefly assessed patient as she is new to clinic and patient is found to have restricted DF ROM likely d/t gastroc tightness and reduced talocrural mobility. Focused on gastroc soft tissue mobilization and stretching this date without issue. Initiated intrinsic strengthening; pt requires verbal and tactile cueing for proper form during arch dome. Pt remained pain free throughout session. Will progress as tolerated. [] No change. [] Other:  [] Patient would continue to benefit from skilled physical therapy services in order to: improve DF ROM, BLE strength, and gait mechanics for improved QOL    STG: (to be met in 9 treatments)  1. ? Pain: Patient to report 0/10 pain with WB  2. ? ROM: Patient to demonstrate 0 degrees R TCJ DF with knee extended  3. ? Strength: Patient to demonstrate the ability to double heel raise with neutral pattern  4. ? Function: Patient to demonstrate the ability to ambulate with neutral gait pattern push off through great toe   5. Independent with Home Exercise Programs  6. Demonstrate Knowledge of fall prevention    Pt. Education:  [x] Yes  [] No  [x] Reviewed Prior HEP/Ed  Method of Education: [x] Verbal  [x] Demo  [x] Written: heel raise, toe yoga, arch dome   Comprehension of Education:  [x] Verbalizes understanding. [x] Demonstrates understanding. [x] Needs review. [] Demonstrates/verbalizes HEP/Ed previously given. Plan: [x] Continue current frequency toward long and short term goals.     [x] Specific Instructions for subsequent treatments: add clams and SL hip abd       Time In: 5:15 pm            Time Out: 6:05 pm    Electronically signed by:  Bertha Brown PT

## 2020-08-05 ENCOUNTER — NURSE ONLY (OUTPATIENT)
Dept: PRIMARY CARE CLINIC | Age: 60
End: 2020-08-05
Payer: COMMERCIAL

## 2020-08-05 PROCEDURE — 96372 THER/PROPH/DIAG INJ SC/IM: CPT | Performed by: INTERNAL MEDICINE

## 2020-08-05 RX ORDER — CYANOCOBALAMIN 1000 UG/ML
1000 INJECTION INTRAMUSCULAR; SUBCUTANEOUS ONCE
Status: COMPLETED | OUTPATIENT
Start: 2020-08-05 | End: 2020-08-05

## 2020-08-05 RX ADMIN — CYANOCOBALAMIN 1000 MCG: 1000 INJECTION INTRAMUSCULAR; SUBCUTANEOUS at 08:19

## 2020-08-05 NOTE — PROGRESS NOTES
After obtaining consent, and per orders of Dr. Vinnie Pires MD, injection of B12 given in Right deltoid by Jyotsna See CMA. Patient instructed to remain in clinic for 20 minutes afterwards, and to report any adverse reaction to me immediately. Patient tolerated injection well.

## 2020-08-10 ENCOUNTER — HOSPITAL ENCOUNTER (OUTPATIENT)
Dept: PHYSICAL THERAPY | Facility: CLINIC | Age: 60
Setting detail: THERAPIES SERIES
Discharge: HOME | End: 2020-08-10
Payer: COMMERCIAL

## 2020-08-10 PROCEDURE — 97016 VASOPNEUMATIC DEVICE THERAPY: CPT

## 2020-08-10 PROCEDURE — 97110 THERAPEUTIC EXERCISES: CPT

## 2020-08-10 PROCEDURE — 97140 MANUAL THERAPY 1/> REGIONS: CPT

## 2020-08-10 NOTE — FLOWSHEET NOTE
[] [address]  [address]  [phone]  F: [phone] [x] [address]  [facility] 36   [address]  P: [phone]  F: [phone] [] [address]  [facility]  P: [phone]  F: [phone] [] [address]  Psychiatric   [address]   [place]: [phone]  F: [phone]      Physical Therapy Daily Treatment Note    Date:  8/10/2020  Patient Name:  April Perez    :  1960  MRN: [id number]  Physician: Edgardo Luis MD                                    Insurance: [address] [place] [address] Diagnosis: M25.571, G89.29                      Rehab Codes: M25.571  Onset date: 2018                       Next Dr's appt. : tbd  Visit# / total visits: 3    Cancels/No Shows: 0/0    Subjective:    Pain:  [x] Yes  [] No Location: R Anterior  TCJ Pain Rating: (0-10 scale) 2-3/10 avg, 0/10 currently  Pain altered Tx:  [] No  [] Yes  Action:  Comments: Pt states she had significant pain yesterday after walking/hiking over uneven ground for 4-6 miles. Pt states that R ankle \"feels ok\" today.     Objective:  Modalities: vasocompression x15 minutes supine, mod pressure   Precautions:  Exercises:  Exercise       Reps/ Time Weight/ Level Comments   Elliptical 6 min           IASTM R gastroc and achilles  Not today       subtalar mobilization, calcaneal distraction  6 min     Belt assisted DF mob 3 min           Static gastroc stretch 5x30\"       Dynamic gastroc stretch 20x             4 way ankle 15x ea lime Added 8/10   Bridges 15x lime Added 8/10   clamshells 15x ea lime    SL hip abd NEXT             Foot dome  20x5\"     Toe yoga  10x       Heel raise  20x      Eccentric heel raise: RLE 10x  Added 8/10. bilat raise, R lower   SLS-RLE 3x15\"  Added 8/10 Other:     Specific Instructions for next treatment: posterior tib strengthening, continue mobilizations for DF. ADD clams and SL hip abd      Treatment Charges: Mins Units   []  Modalities     [x]  Ther Exercise 26 1   [x]  Manual Therapy 9 1   []  Ther Activities     []  Aquatics     []  Vasocompression 15 1   []  Other     Total Treatment time 50 3       Assessment: [x] Progressing toward goals. Anterior talocrural clicking/creptius noted with all active dorsiflexion/plantarflexion this date, pain associated. Slight improvement in pain with therapist providing calcaneal distraction during DF/PF. Attempted single limb concentric heel raises on RLE however pt reports pain; modified exercise to eccentric heel raise on RLE with improvement in pain. Added bridges and clamshells due to significant hip weakness noted on evaluation. Will progress as tolerated. [] No change. [] Other:  [] Patient would continue to benefit from skilled physical therapy services in order to: improve DF ROM, BLE strength, and gait mechanics for improved QOL    STG: (to be met in 9 treatments)  1. ? Pain: Patient to report 0/10 pain with WB  2. ? ROM: Patient to demonstrate 0 degrees R TCJ DF with knee extended  3. ? Strength: Patient to demonstrate the ability to double heel raise with neutral pattern  4. ? Function: Patient to demonstrate the ability to ambulate with neutral gait pattern push off through great toe   5. Independent with Home Exercise Programs  6. Demonstrate Knowledge of fall prevention    Pt. Education:  [x] Yes  [] No  [x] Reviewed Prior HEP/Ed  Method of Education: [x] Verbal  [x] Demo  [x] Written: 4 way ankle, clams, bridges  Comprehension of Education:  [x] Verbalizes understanding. [] Demonstrates understanding. [x] Needs review. [] Demonstrates/verbalizes HEP/Ed previously given. Plan: [x] Continue current frequency toward long and short term goals.     [x] Specific Instructions for subsequent treatments: add SL hip abd, SLS on foam        Time In: 4:35 pm            Time Out: 5:30 pm    Electronically signed by:  Mere Knox PT

## 2020-08-12 ENCOUNTER — NURSE ONLY (OUTPATIENT)
Dept: PRIMARY CARE CLINIC | Age: 60
End: 2020-08-12
Payer: COMMERCIAL

## 2020-08-12 PROCEDURE — 96372 THER/PROPH/DIAG INJ SC/IM: CPT | Performed by: INTERNAL MEDICINE

## 2020-08-12 RX ORDER — CYANOCOBALAMIN 1000 UG/ML
1000 INJECTION INTRAMUSCULAR; SUBCUTANEOUS ONCE
Status: COMPLETED | OUTPATIENT
Start: 2020-08-12 | End: 2020-08-12

## 2020-08-12 RX ADMIN — CYANOCOBALAMIN 1000 MCG: 1000 INJECTION INTRAMUSCULAR; SUBCUTANEOUS at 08:19

## 2020-08-18 ENCOUNTER — HOSPITAL ENCOUNTER (OUTPATIENT)
Dept: PHYSICAL THERAPY | Facility: CLINIC | Age: 60
Setting detail: THERAPIES SERIES
Discharge: HOME | End: 2020-08-18
Payer: COMMERCIAL

## 2020-08-18 PROCEDURE — 97110 THERAPEUTIC EXERCISES: CPT

## 2020-08-18 PROCEDURE — 97140 MANUAL THERAPY 1/> REGIONS: CPT

## 2020-08-18 NOTE — PROGRESS NOTES
[] [facility]  [address]  [phone]  F: [phone] [x] [address] [facility] 36   [address]  P: [phone]  F: [phone] [] [address]  [address] [facility]  P: [phone]  F: [phone] [] [address]  [facility]   [address]   [place]: [phone]  F: [phone]      Physical Therapy Progress Note    Date: 2020      Patient: Erica Moore  : 1960  MRN: [id number]     [facility], MD                                    Insurance: [address], G89.29                      Rehab Codes: M25.571  Onset date: 2018                       Next Dr's appt. : tbd  Visit# / total visits: 4/9                        Cancels/No Shows: 0/0  Date range of services: 20 to 20    Subjective:    Pain:  [x]? Yes  []? No   Location: R Anterior  TCJ        Pain Rating: (0-10 scale) 2-3/10 avg, 0/10 currently  Pain altered Tx:  []? No  []? Yes  Action:  Comments: Pt reports that R anterior ankle pain was pretty severe over the last week; notes she was sore after therapy last session and then was walking around the beach all weekend barefoot. States she sat a lot today at work and denies pain upon arrival today. Assessment:  []? Progressing toward goals. [x]? No change. Pt continues to report painful dorsiflexion and plantarflexion of the R ankle with worsened symptoms after prolonged ambulation and activity. Painful crepitus noted at talocrural joint line with all DF/PF. Pt reports that home exercises issued seem to provoke her pain and has not noticed any improvement overall.  Due to limited progress made in therapy, it is recommended that patient follow up with Dr. Irene Aguirre for further evaluation and treatment of chronic R ankle pain. []? Other:  []? Patient would continue to benefit from skilled physical therapy services in order to: improve DF ROM, BLE strength, and gait mechanics for improved QOL     STG: (to be met in 9 treatments)- assessed 8/18/20  1. ? Pain: Patient to report 0/10 pain with WB-NOT MET, 6/10 with ambulation all last week  2. ? ROM: Patient to demonstrate 0 degrees R TCJ DF with knee extended-Progress made, +3 °   3. ? Strength: Patient to demonstrate the ability to double heel raise with neutral pattern- MET with cueing  4. ? Function: Patient to demonstrate the ability to ambulate with neutral gait pattern push off through great toe- NOT MET, requires verbal cueing with little improvement    5. Independent with Home Exercise Programs-MET  6. Demonstrate Knowledge of fall prevention-MET    Treatment Plan:  [x] Therapeutic Exercise   46498  [] Iontophoresis: 4 mg/mL Dexamethasone Sodium Phosphate  mAmin  38016   [x] Therapeutic Activity  80656 [x] Vasopneumatic cold with compression  62682    [x] Gait Training   50758 [] Ultrasound   67750   [] Neuromuscular Re-education  77807 [] Electrical Stimulation Unattended  18459   [x] Manual Therapy  49869 [] Electrical Stimulation Attended  28113   [x] Instruction in HEP  [] Lumbar/Cervical Traction  13110   [] Aquatic Therapy   08902 [] Cold/hotpack    [] Massage   43678      [] Dry Needling, 1 or 2 muscles  83620   [] Biofeedback, first 15 minutes   56599  [] Biofeedback, additional 15 minutes   99040 [] Dry Needling, 3 or more muscles  27821       Patient Status:     [] Continue per initial plan of care. [] Additional visits necessary. [x] Other: will hold physical therapy until ortho consultation     Electronically signed by Rachel Heart PT on 8/18/2020 at 7:02 PM      If you have any questions or concerns, please don't hesitate to call.   Thank you for your referral.    Physician

## 2020-08-18 NOTE — FLOWSHEET NOTE
[] [address]  [address]  [phone]  F: [phone] [x] [address]  [facility] 36   [address]  P: [phone]  F: [phone] [] [address]  [address] [facility]  P: [phone]  F: [phone] [] [address]  [facility]   [address]   [place]: [phone]  F: [phone]      Physical Therapy Daily Treatment Note    Date:  2020  Patient Name:  [place] Severe    :  1960  MRN: [id number]  Physician: Lorie Oneal MD                                    Insurance: [address] [place] [address] Diagnosis: M25.571, G89.29                      Rehab Codes: M25.571  Onset date: 2018                       Next 's appt. : tbd  Visit# / total visits:     Cancels/No Shows: 0/0    Subjective:    Pain:  [x] Yes  [] No Location: R Anterior  TCJ Pain Rating: (0-10 scale) 2-3/10 avg, 0/10 currently  Pain altered Tx:  [] No  [] Yes  Action:  Comments: Pt reports that R anterior ankle pain was pretty severe over the last week; notes she was sore after therapy last session and then was walking around the beach all weekend barefoot. States she sat a lot today at work and denies pain upon arrival today.      Objective:  Modalities: vasocompression x15 minutes supine, mod pressure   Precautions:  Exercises: bolded completed 20   Exercise       Reps/ Time Weight/ Level Comments   Elliptical 5 min           IASTM R gastroc, achilles, tib anterior  5 min        Subtalar mobilization, calcaneal distraction  4 min     Belt assisted DF mob 3 min           Static gastroc stretch 5x30\"       Dynamic gastroc stretch 20x             4 way ankle 15x ea lime Added 8/10   Bridges 15x lime Added 8/10   clamshells 15x ea lime    SL hip abd NEXT             Foot dome  20x5\" Toe yoga  10x       Heel raise  10x      Eccentric heel raise: RLE 10x  Added 8/10. bilat raise, R lower   SLS-RLE 3x15\"  Added 8/10         Gait training  3 min  Focusing on push off through great toe and avoiding toe out    Other:     Specific Instructions for next treatment: posterior tib strengthening, continue mobilizations for DF. ADD clams and SL hip abd      Treatment Charges: Mins Units   []  Modalities     [x]  Ther Exercise 30 2   [x]  Manual Therapy 9 1   []  Ther Activities     []  Aquatics     []  Vasocompression     [x]  Other: gait 3 --   Total Treatment time 42 3       Assessment: [] Progressing toward goals. [x] No change. Pt continues to report painful dorsiflexion and plantarflexion of the R ankle with worsened symptoms after prolonged ambulation and activity. Crepitus noted at talocrural joint line with all DF/PF. Pt reports that home exercises issued seem to provoke her pain and has not noticed any improvement overall. Due to limited progress made in therapy, it is recommended that patient follow up with Dr. Baylee Paul for further evaluation and treatment of chronic R ankle pain. [] Other:  [] Patient would continue to benefit from skilled physical therapy services in order to: improve DF ROM, BLE strength, and gait mechanics for improved QOL    STG: (to be met in 9 treatments)- assessed 8/18/20  1. ? Pain: Patient to report 0/10 pain with WB-NOT MET, 6/10 with ambulation all last week  2. ? ROM: Patient to demonstrate 0 degrees R TCJ DF with knee extended-Progress made, +3 °   3. ? Strength: Patient to demonstrate the ability to double heel raise with neutral pattern- MET with cueing  4. ? Function: Patient to demonstrate the ability to ambulate with neutral gait pattern push off through great toe- NOT MET, requires verbal cueing with little improvement    5. Independent with Home Exercise Programs-MET  6. Demonstrate Knowledge of fall prevention-MET    Pt.  Education:  [x] Yes  [] No [x] Reviewed Prior HEP/Ed  Method of Education: [x] Verbal  [] Demo  [] Written:   Comprehension of Education:  [x] Verbalizes understanding. [] Demonstrates understanding. [] Needs review. [x] Demonstrates/verbalizes HEP/Ed previously given. Plan: [x] Continue current frequency toward long and short term goals.     [x] Specific Instructions for subsequent treatments: hold until follow up with doc      Time In: 6:00 pm            Time Out: 6:48 pm    Electronically signed by:  Lisa Benitez PT

## 2020-08-19 ENCOUNTER — NURSE ONLY (OUTPATIENT)
Dept: PRIMARY CARE CLINIC | Age: 60
End: 2020-08-19
Payer: COMMERCIAL

## 2020-08-19 PROCEDURE — 96372 THER/PROPH/DIAG INJ SC/IM: CPT | Performed by: INTERNAL MEDICINE

## 2020-08-19 RX ORDER — CYANOCOBALAMIN 1000 UG/ML
1000 INJECTION INTRAMUSCULAR; SUBCUTANEOUS ONCE
Status: COMPLETED | OUTPATIENT
Start: 2020-08-19 | End: 2020-08-19

## 2020-08-19 RX ADMIN — CYANOCOBALAMIN 1000 MCG: 1000 INJECTION INTRAMUSCULAR; SUBCUTANEOUS at 08:22

## 2020-08-19 NOTE — PROGRESS NOTES
After obtaining consent, and per orders of Dr. Spenser Hammond, injection of B12 given in Right deltoid by Sailaja Herrera. Patient instructed to report any adverse reaction to the office immediately. Patient tolerated injection & left the office a few minutes later feeling well.     Medication checked with Lakisha PARRISH

## 2020-08-26 ENCOUNTER — NURSE ONLY (OUTPATIENT)
Dept: PRIMARY CARE CLINIC | Age: 60
End: 2020-08-26
Payer: COMMERCIAL

## 2020-08-26 PROCEDURE — 96372 THER/PROPH/DIAG INJ SC/IM: CPT | Performed by: INTERNAL MEDICINE

## 2020-08-26 RX ORDER — CYANOCOBALAMIN 1000 UG/ML
1000 INJECTION INTRAMUSCULAR; SUBCUTANEOUS ONCE
Status: COMPLETED | OUTPATIENT
Start: 2020-08-26 | End: 2020-08-26

## 2020-08-26 RX ADMIN — CYANOCOBALAMIN 1000 MCG: 1000 INJECTION INTRAMUSCULAR; SUBCUTANEOUS at 10:56

## 2020-09-02 ENCOUNTER — NURSE ONLY (OUTPATIENT)
Dept: PRIMARY CARE CLINIC | Age: 60
End: 2020-09-02
Payer: COMMERCIAL

## 2020-09-02 PROCEDURE — 96372 THER/PROPH/DIAG INJ SC/IM: CPT | Performed by: INTERNAL MEDICINE

## 2020-09-02 RX ORDER — CYANOCOBALAMIN 1000 UG/ML
1000 INJECTION INTRAMUSCULAR; SUBCUTANEOUS ONCE
Status: COMPLETED | OUTPATIENT
Start: 2020-09-02 | End: 2020-09-02

## 2020-09-02 RX ADMIN — CYANOCOBALAMIN 1000 MCG: 1000 INJECTION INTRAMUSCULAR; SUBCUTANEOUS at 08:26

## 2020-09-02 NOTE — PROGRESS NOTES
After obtaining consent, and per orders of Dr. Dada Brock MD, injection of B12 given in Right deltoid by Jyotsna See CMA. Patient instructed to remain in clinic for 20 minutes afterwards, and to report any adverse reaction to me immediately. Patient tolerated injection well.

## 2020-11-17 ENCOUNTER — OFFICE VISIT (OUTPATIENT)
Dept: ORTHOPEDIC SURGERY | Age: 60
End: 2020-11-17
Payer: COMMERCIAL

## 2020-11-17 VITALS — WEIGHT: 162 LBS | BODY MASS INDEX: 29.81 KG/M2 | HEIGHT: 62 IN | TEMPERATURE: 97.2 F | RESPIRATION RATE: 12 BRPM

## 2020-11-17 PROCEDURE — 99204 OFFICE O/P NEW MOD 45 MIN: CPT | Performed by: ORTHOPAEDIC SURGERY

## 2020-11-17 NOTE — LETTER
Dr. Sunitha Saunders  [address] [place] [address]  [phone]        11/17/2020     Patient: Micheline Berger  YOB: 1960    Dear Ayana Rose MD,    I had the pleasure of seeing one of your patients, Micheline Berger, recently in the office. Below are the relevant portions of my assessment and plan of care. ASSESSMENT AND PLAN:  She has right ankle pain secondary to a large lateral talus osteochondral defect, as well as some underlying degenerative changes at the tibiotalar joint . Notably, she has no relevant past medical history. I had a long discussion today with the patient about the likely diagnosis and its natural history, physical exam and imaging findings, as well as treatment options in detail. We discussed rest/activity modification, swelling control, NSAIDs/Acetaminophen/topical anesthetics, orthotics/shoewear modification, bracing/immobilization, injections, and physical therapy. Surgically, we discussed a possible future right talus hemiallograft with fibular osteotomy. At this point, she reports that she can walk through grocery store, but is unable to do much after that in terms of activity due to significant pain. She is considering surgery in the spring, as she wishes to ski in the winter. The patient wishes to proceed with the recommendations as above. Orders/referrals were placed as below at today's visit. The patient was referred to prosthetics orthotics to obtain a custom [place] brace. I provided a prescription for Voltaren (4g TOPL q QID PRN pain).  I recommend this over the use of oral NSAIDs because given the patient's condition, the use of oral NSAIDs for an extended period of time will likely be necessary to help allow appropriate meaningful baseline function; and given the risk for development of side effects (GI bleeding/ulcers) with chronic use of oral anti-inflammatories, this is a much safer option. This is especially important in this situation given the patient's age as well. All questions were answered and the patient agrees with the above plan. The patient will return to clinic in 3 months. At her next visit, we may consider an ankle injection, oral NSAIDs, and again we will discuss surgery as above. Thank you for allowing me to participate in the care of this patient. I look forward to serving you and your patients again in the future. Please don't hesitate to contact me at my mobile number .         Jaime Benson MD  Orthopedic Surgery, Foot and Ankle

## 2020-11-17 NOTE — PROGRESS NOTES
[facility] [address] [facility]  Shady Maurer [facility].  [address] [facility]  Dept: [phone]    Ambulatory Orthopedic Consult      CHIEF COMPLAINT:    Chief Complaint   Patient presents with    Ankle Pain     Right Ankle       HISTORY OF PRESENT ILLNESS:      The patient is a 63 y.o. female who is being seen for consultation and evaluation of pain at the anterior ankle joint, which began in 2018 atraumatically. The pain is described mainly with mechanical terms (dull/sharp/throbbing). The pain is worse with activity and better with rest. The patient reports a progressive course. The patient has tried:   Ice    [x]  rest/activity modification          [x]  NSAIDs      []  opiates      []  orthotics        [x]  change in shoes   []  home exercises  [x]  physical therapy      []  CAM boot     []  brace:    []  injection:       []  surgery:        REVIEW OF SYSTEMS:  Constitutional: Negative for fever. HENT: Negative for tinnitus. Eyes: Negative for pain. Respiratory: Negative for shortness of breath. Cardiovascular: Negative for chest pain. Gastrointestinal: Negative for abdominal pain. Genitourinary: Negative for dysuria. Skin: Negative for rash. Neurological: Negative for headaches. Hematological: Does not bruise/bleed easily. Musculoskeletal: See HPI for pertinent positives     Past Medical History:    She  has a past medical history of Dermatitis, MVP (mitral valve prolapse), and Wears glasses. Past Surgical History:    She  has a past surgical history that includes  section; vaginoplasty; and Tonsillectomy and adenoidectomy.      Current Medications:     Current Outpatient Medications:     acetaminophen (TYLENOL) 500 MG tablet, Take 1,000 mg by mouth 2 times daily as needed for Pain, Disp: , Rfl:     vitamin D (ERGOCALCIFEROL) 1.25 MG (43689 UT) CAPS capsule, Take 1 capsule by mouth once a week (Patient not taking: Reported on 2020), Disp: 12 capsule, Rfl: 1     Allergies:    Demerol hcl [meperidine] and Tetracyclines & related    Family History:  family history includes COPD in her brother; Diabetes in her brother; Emphysema in her father; Heart Disease in her mother; Hypertension in her brother and father; Jerre Brown in her father; Lupus in her mother and sister; Stroke in her maternal grandmother. Social History:   Social History     Occupational History    Not on file   Tobacco Use    Smoking status: Never Smoker    Smokeless tobacco: Never Used   Substance and Sexual Activity    Alcohol use: Yes     Alcohol/week: 6.0 standard drinks     Types: 6 Standard drinks or equivalent per week    Drug use: No    Sexual activity: Yes     Partners: Male     Occupation: RN at [employer], full-time seated work     OBJECTIVE:  Temp 97.2 °F (36.2 °C)   Resp 12   Ht 5' 2\" (1.575 m)   Wt 162 lb (73.5 kg)   LMP 01/01/1999 (Approximate)   BMI 29.63 kg/m²    Psych: alert and oriented to person, time, and place  Cardio:  well perfused extremities  Resp:  normal respiratory effort  Skin:  no cyanosis  Hem/lymph:  no lymphedema  Neuro:  sensation to light touch grossly intact throughout all nerve distributions in the foot   Musculoskeletal:    RLE:  Alignment:  Heel neutral   Vascular: Toes warm and well perfused, compartments soft/compressible. No significant swelling of foot. Skin: Intact without rash/lesions/AV malformations. Strength: Able to fire/perform the following with appropriate strength:    [x]  Tib Ant:     [x]  Gastroc-Soleus:         [x]  Inversion:    [x]  Eversion:         [x]  FHL:     [x]  EHL:      Motion:  Normal for the following joints:    []  Ankle:     [x]  Subtalar:        [x]  1st MTP:      []  1st TMT:            Tenderness to Palpation:    Tenderness to palpation:  anterior ankle joint line  -no laxity to varus/valgus stress      LLE:  Alignment:  Heel neutral   Vascular: Toes warm and well perfused, compartments soft/compressible. No significant swelling of foot. Skin: Intact without rash/lesions/AV malformations. Strength: Able to fire/perform the following with appropriate strength:    [x]  Tib Ant:     [x]  Gastroc-Soleus:         [x]  Inversion:    [x]  Eversion:         [x]  FHL:     [x]  EHL:      Motion:  Normal for the following joints:    [x]  Ankle:     [x]  Subtalar:        [x]  1st MTP:      []  1st TMT:            Tenderness to Palpation:    Tenderness to palpation: None      RADIOLOGY:   11/17/2020 FINDINGS:  Three weightbearing views (AP, Mortise, and Lateral) of the right ankle and three weightbearing views (AP, Oblique, Lateral) of the right foot were obtained in the office today and reviewed, revealing no acute fracture, dislocation, or radioopaque foreign body/tumor. Degenerative changes of tibiotalar joint with joint narrowing, sclerosis, and osteophytes. Large lateral osteochondral defect of the talus. IMPRESSION:  No acute fracture/dislocation. Degenerative changes as above. Large OCD as above. Electronically signed by Pamela Foreman MD      No results found. ASSESSMENT AND PLAN:  Praveen Jose was seen today for Ankle Pain (Right Ankle)  The primary encounter diagnosis was Post-traumatic arthritis of right ankle. A diagnosis of Osteochondral defect of ankle was also pertinent to this visit. Body mass index is 29.63 kg/m². She has right ankle pain secondary to a large lateral talus osteochondral defect, as well as some underlying degenerative changes at the tibiotalar joint . Notably, she has no relevant past medical history. I had a long discussion today with the patient about the likely diagnosis and its natural history, physical exam and imaging findings, as well as treatment options in detail. We discussed rest/activity modification, swelling control, NSAIDs/Acetaminophen/topical anesthetics, orthotics/shoewear modification, bracing/immobilization, injections, and physical therapy. Surgically, we discussed a possible future right talus hemiallograft with fibular osteotomy. At this point, she reports that she can walk through grocery store, but is unable to do much after that in terms of activity due to significant pain. She is considering surgery in the spring, as she wishes to ski in the winter. The patient wishes to proceed with the recommendations as above. Orders/referrals were placed as below at today's visit. The patient was referred to prosthetics orthotics to obtain a custom [place] brace. I provided a prescription for Voltaren (4g TOPL q QID PRN pain). I recommend this over the use of oral NSAIDs because given the patient's condition, the use of oral NSAIDs for an extended period of time will likely be necessary to help allow appropriate meaningful baseline function; and given the risk for development of side effects (GI bleeding/ulcers) with chronic use of oral anti-inflammatories, this is a much safer option. This is especially important in this situation given the patient's age as well. All questions were answered and the patient agrees with the above plan. The patient will return to clinic in 3 months. At her next visit, we may consider an ankle injection, oral NSAIDs, and again we will discuss surgery as above. Return in about 3 months (around 2/17/2021). No orders of the defined types were placed in this encounter. Orders Placed This Encounter   Procedures    DME Order for Orthosis as OP     Eval and treat. Please provide custom [place] brace. Jeremy Street MD  Orthopedic Surgery, Foot and Ankle         Jeremy Street MD  Orthopedic Surgery, Foot and Ankle        Please excuse any typos/errors, as this note was created with the assistance of voice recognition software.  While intending to generate a document that actually reflects the content of the visit, the document can still have some errors including those of syntax and sound-a-like substitutions which may escape proof reading. In such instances, actual meaning can be extrapolated by context.

## 2022-07-12 ENCOUNTER — OFFICE VISIT (OUTPATIENT)
Dept: PRIMARY CARE CLINIC | Age: 62
End: 2022-07-12
Payer: COMMERCIAL

## 2022-07-12 VITALS
HEART RATE: 68 BPM | HEIGHT: 62 IN | RESPIRATION RATE: 16 BRPM | DIASTOLIC BLOOD PRESSURE: 84 MMHG | WEIGHT: 162.6 LBS | BODY MASS INDEX: 29.92 KG/M2 | SYSTOLIC BLOOD PRESSURE: 138 MMHG | OXYGEN SATURATION: 98 %

## 2022-07-12 DIAGNOSIS — E55.9 VITAMIN D DEFICIENCY: ICD-10-CM

## 2022-07-12 DIAGNOSIS — R73.09 IMPAIRED GLUCOSE METABOLISM: ICD-10-CM

## 2022-07-12 DIAGNOSIS — R53.82 CHRONIC FATIGUE: Primary | ICD-10-CM

## 2022-07-12 DIAGNOSIS — E78.9 LIPID DISORDER: ICD-10-CM

## 2022-07-12 DIAGNOSIS — Z12.31 SCREENING MAMMOGRAM, ENCOUNTER FOR: ICD-10-CM

## 2022-07-12 PROCEDURE — 99214 OFFICE O/P EST MOD 30 MIN: CPT | Performed by: STUDENT IN AN ORGANIZED HEALTH CARE EDUCATION/TRAINING PROGRAM

## 2022-07-12 SDOH — ECONOMIC STABILITY: FOOD INSECURITY: WITHIN THE PAST 12 MONTHS, THE FOOD YOU BOUGHT JUST DIDN'T LAST AND YOU DIDN'T HAVE MONEY TO GET MORE.: NEVER TRUE

## 2022-07-12 SDOH — ECONOMIC STABILITY: FOOD INSECURITY: WITHIN THE PAST 12 MONTHS, YOU WORRIED THAT YOUR FOOD WOULD RUN OUT BEFORE YOU GOT MONEY TO BUY MORE.: NEVER TRUE

## 2022-07-12 ASSESSMENT — PATIENT HEALTH QUESTIONNAIRE - PHQ9
SUM OF ALL RESPONSES TO PHQ QUESTIONS 1-9: 0
2. FEELING DOWN, DEPRESSED OR HOPELESS: 0
SUM OF ALL RESPONSES TO PHQ9 QUESTIONS 1 & 2: 0
1. LITTLE INTEREST OR PLEASURE IN DOING THINGS: 0
SUM OF ALL RESPONSES TO PHQ QUESTIONS 1-9: 0

## 2022-07-12 ASSESSMENT — SOCIAL DETERMINANTS OF HEALTH (SDOH): HOW HARD IS IT FOR YOU TO PAY FOR THE VERY BASICS LIKE FOOD, HOUSING, MEDICAL CARE, AND HEATING?: NOT HARD AT ALL

## 2022-07-12 ASSESSMENT — ENCOUNTER SYMPTOMS
ABDOMINAL DISTENTION: 0
WHEEZING: 0
EYE DISCHARGE: 0
RHINORRHEA: 0
EYE ITCHING: 0
COUGH: 0
ABDOMINAL PAIN: 0
BACK PAIN: 0
SHORTNESS OF BREATH: 0

## 2022-07-12 NOTE — PROGRESS NOTES
[address] [facility] PRIMARY CARE  [facility] [address] [facility] [address]  [address] [address]  Dept: [phone]  Dept Fax: [phone]    Francheska Martinez is a 60 y.o. female who presents today for her medical conditions/complaints as noted below. Chief Complaint   Patient presents with    New Patient    Annual Exam       HPI:     60 y. o.f presented to office today as a regular follow-up visit. Denies any current complaints. No chronic medical issues except MVP that was thoroughly worked up in the past.   No other current complaints. Vital signs stable. Ordered mammogram.  Declined all vaccinations. Advised lifestyle changes. Medications reviewed and refilled as appropriate, problem list updated. All concerns discussed in details and all questions answered to patient satisfaction.             Hemoglobin A1C (%)   Date Value   2020 5.6             ( goal A1C is < 7)   No results found for: LABMICR  LDL Cholesterol (mg/dL)   Date Value   2020 83   03/15/2016 106   2014 103       (goal LDL is <100)   AST (U/L)   Date Value   2020 13     ALT (U/L)   Date Value   2020 11     BUN (mg/dL)   Date Value   2020 16     BP Readings from Last 3 Encounters:   22 138/84   20 122/72   19 136/80          (goal 120/80)    Past Medical History:   Diagnosis Date    Dermatitis     MVP (mitral valve prolapse)     Wears glasses       Past Surgical History:   Procedure Laterality Date     SECTION      TONSILLECTOMY AND ADENOIDECTOMY      VAGINOPLASTY         Family History   Problem Relation Age of Onset    Heart Disease Mother     Lupus Mother     Lung Cancer Father     Hypertension Father     Emphysema Father     Lupus Sister     Hypertension Brother     COPD Brother     Diabetes Brother     Stroke Maternal Grandmother        Social History     Tobacco Use    Smoking status: Never    Smokeless tobacco: Never   Substance Use Topics Alcohol use: Yes     Alcohol/week: 6.0 standard drinks     Types: 6 Standard drinks or equivalent per week      No current outpatient medications on file. No current facility-administered medications for this visit. Allergies   Allergen Reactions    Demerol Hcl [Meperidine]     Tetracyclines & Related        Health Maintenance   Topic Date Due    COVID-19 Vaccine (1) Never done    Shingles vaccine (1 of 2) Never done    Breast cancer screen  02/03/2018    DTaP/Tdap/Td vaccine (2 - Tdap) 10/01/2020    Cervical cancer screen  05/15/2022    Flu vaccine (1) 08/01/2022    Depression Screen  07/12/2023    Colorectal Cancer Screen  05/14/2024    Lipids  01/06/2025    Hepatitis A vaccine  Aged Out    Hib vaccine  Aged Out    Meningococcal (ACWY) vaccine  Aged Out    Pneumococcal 0-64 years Vaccine  Aged Out    Hepatitis C screen  Discontinued    HIV screen  Discontinued       Subjective:      Review of Systems   Constitutional:  Negative for chills, fatigue and fever. HENT:  Negative for congestion, hearing loss and rhinorrhea. Eyes:  Negative for discharge and itching. Respiratory:  Negative for cough, shortness of breath and wheezing. Cardiovascular:  Negative for chest pain, palpitations and leg swelling. Gastrointestinal:  Negative for abdominal distention and abdominal pain. Endocrine: Negative for polydipsia and polyphagia. Genitourinary:  Negative for difficulty urinating and dysuria. Musculoskeletal:  Negative for arthralgias and back pain. Skin:  Negative for rash and wound. Neurological:  Negative for dizziness, seizures, light-headedness and headaches. Psychiatric/Behavioral:  Negative for agitation and behavioral problems. Objective:     Physical Exam  Constitutional:       Appearance: She is well-developed. HENT:      Head: Normocephalic and atraumatic. Eyes:      Conjunctiva/sclera: Conjunctivae normal.      Pupils: Pupils are equal, round, and reactive to light. Neck:      Thyroid: No thyromegaly. Vascular: No JVD. Cardiovascular:      Rate and Rhythm: Normal rate and regular rhythm. Heart sounds: Normal heart sounds. No murmur heard. Pulmonary:      Effort: Pulmonary effort is normal.      Breath sounds: Normal breath sounds. No wheezing. Abdominal:      General: Bowel sounds are normal. There is no distension. Palpations: Abdomen is soft. Tenderness: There is no abdominal tenderness. There is no rebound. Musculoskeletal:         General: No tenderness. Normal range of motion. Cervical back: Normal range of motion and neck supple. Neurological:      Mental Status: She is alert and oriented to person, place, and time. Cranial Nerves: No cranial nerve deficit. Psychiatric:         Behavior: Behavior normal.     /84   Pulse 68   Resp 16   Ht 5' 2\" (1.575 m)   Wt 162 lb 9.6 oz (73.8 kg)   LMP 01/01/1999 (Approximate)   SpO2 98%   BMI 29.74 kg/m²     Assessment:       Diagnoses and all orders for this visit:  Annual physical exam  -     CBC with Auto Differential; Future  -     Comprehensive Metabolic Panel; Future  -     Hemoglobin A1C; Future  -     Lipid Panel; Future  -     Vitamin D 25 Hydroxy; Future  -     Vitamin B12 & Folate; Future  -     TSH with Reflex; Future  Screening mammogram, encounter for  -     LEBRON DIGITAL SCREEN W OR WO CAD BILATERAL; Future         Plan:      Return in about 1 year (around 7/12/2023).     Orders Placed This Encounter   Procedures    LEBRON DIGITAL SCREEN W OR WO CAD BILATERAL     Standing Status:   Future     Standing Expiration Date:   9/12/2023     Order Specific Question:   Reason for exam:     Answer:   screening    CBC with Auto Differential     Standing Status:   Future     Standing Expiration Date:   11/15/2023    Comprehensive Metabolic Panel     Standing Status:   Future     Standing Expiration Date:   5/15/2024    Hemoglobin A1C     Standing Status:   Future     Standing Expiration Date:   5/15/2024    Lipid Panel     Standing Status:   Future     Standing Expiration Date:   11/15/2023     Order Specific Question:   Is Patient Fasting?/# of Hours     Answer:   8-10 hrs    Vitamin D 25 Hydroxy     Standing Status:   Future     Standing Expiration Date:   5/15/2024    Vitamin B12 & Folate     Standing Status:   Future     Standing Expiration Date:   11/15/2023    TSH with Reflex     Standing Status:   Future     Standing Expiration Date:   11/15/2023     No orders of the defined types were placed in this encounter. Patient given educational materials - see patient instructions. Discussed use, benefit, and side effects of prescribedmedications. All patient questions answered. Pt voiced understanding. Reviewed health maintenance. Instructed to continue current medications, diet and exercise. Patient agreed with treatment plan. Follow up as directed. Electronically signed by   Merry Pittman MD on 11/15/2022 at 1:43 PM  [facility]. Please note that this chart was generated using voice recognition Dragon dictation software. Although every effort was made to ensure the accuracy of this automatedtranscription, some errors in transcription may have occurred.

## 2022-11-15 ENCOUNTER — TELEPHONE (OUTPATIENT)
Dept: PRIMARY CARE CLINIC | Age: 62
End: 2022-11-15

## 2022-11-15 PROBLEM — R53.82 CHRONIC FATIGUE: Status: ACTIVE | Noted: 2022-11-15

## 2022-11-15 NOTE — TELEPHONE ENCOUNTER
Patient calling into office they state that insurance states that wellness code needs to be first. Patient is asking why other codes are on for fatigue, vitamin deficiency. States that it wasn't a complaint, it wasn't discussed and she wasn't treated for it. Prediabetic code needs to be removed,she is not being treated for this, was also not discussed with . Q906 and e559 also needs removed. Patient states again not a current issue, not discussed at appointment. States that these are all Temporary things from 2019. [employer] needs a new bill sent with the corrected code of [phone] as primary code before they will pay. Which is for wellness visit.     Patient would a call once this is resolved, or there is an update

## 2022-11-21 NOTE — TELEPHONE ENCOUNTER
Everything has been updated, but the card on file is a health savings program, not insurance. If patient is still experiencing issues, she will need to contact the billing department and possibly need to resubmit the claim herself.

## 2022-11-21 NOTE — TELEPHONE ENCOUNTER
Patient advised, she requested the DX codes that where used. Gave her those.  She states that she will follow up with billing in a week

## 2024-05-30 ENCOUNTER — HOSPITAL ENCOUNTER (OUTPATIENT)
Age: 64
Setting detail: OBSERVATION
Discharge: HOME | End: 2024-05-31
Attending: EMERGENCY MEDICINE | Admitting: EMERGENCY MEDICINE
Payer: MEDICARE

## 2024-05-30 ENCOUNTER — APPOINTMENT (OUTPATIENT)
Dept: GENERAL RADIOLOGY | Age: 64
End: 2024-05-30
Payer: MEDICARE

## 2024-05-30 DIAGNOSIS — R07.9 CHEST PAIN, UNSPECIFIED TYPE: Primary | ICD-10-CM

## 2024-05-30 DIAGNOSIS — I20.9 ANGINA PECTORIS (HCC): ICD-10-CM

## 2024-05-30 DIAGNOSIS — R06.02 SHORTNESS OF BREATH: ICD-10-CM

## 2024-05-30 DIAGNOSIS — R00.2 PALPITATIONS: ICD-10-CM

## 2024-05-30 LAB
ALBUMIN SERPL-MCNC: 4.6 G/DL (ref 3.5–5.2)
ALBUMIN/GLOB SERPL: 2 {RATIO} (ref 1–2.5)
ALP SERPL-CCNC: 79 U/L (ref 35–104)
ALT SERPL-CCNC: 11 U/L (ref 10–35)
ANION GAP SERPL CALCULATED.3IONS-SCNC: 11 MMOL/L (ref 9–16)
AST SERPL-CCNC: 24 U/L (ref 10–35)
BASOPHILS # BLD: 0.03 K/UL (ref 0–0.2)
BASOPHILS NFR BLD: 1 % (ref 0–2)
BILIRUB SERPL-MCNC: 0.3 MG/DL (ref 0–1.2)
BNP SERPL-MCNC: 115 PG/ML (ref 0–300)
BUN SERPL-MCNC: 12 MG/DL (ref 8–23)
CALCIUM SERPL-MCNC: 10.5 MG/DL (ref 8.6–10.4)
CHLORIDE SERPL-SCNC: 108 MMOL/L (ref 98–107)
CO2 SERPL-SCNC: 23 MMOL/L (ref 20–31)
CREAT SERPL-MCNC: 0.7 MG/DL (ref 0.5–0.9)
EOSINOPHIL # BLD: 0.17 K/UL (ref 0–0.44)
EOSINOPHILS RELATIVE PERCENT: 3 % (ref 1–4)
ERYTHROCYTE [DISTWIDTH] IN BLOOD BY AUTOMATED COUNT: 13.1 % (ref 11.8–14.4)
GFR, ESTIMATED: 90 ML/MIN/1.73M2
GLUCOSE SERPL-MCNC: 93 MG/DL (ref 74–99)
HCT VFR BLD AUTO: 38.5 % (ref 36.3–47.1)
HGB BLD-MCNC: 12.8 G/DL (ref 11.9–15.1)
IMM GRANULOCYTES # BLD AUTO: <0.03 K/UL (ref 0–0.3)
IMM GRANULOCYTES NFR BLD: 0 %
LYMPHOCYTES NFR BLD: 0.97 K/UL (ref 1.1–3.7)
LYMPHOCYTES RELATIVE PERCENT: 19 % (ref 24–43)
MCH RBC QN AUTO: 29.9 PG (ref 25.2–33.5)
MCHC RBC AUTO-ENTMCNC: 33.2 G/DL (ref 28.4–34.8)
MCV RBC AUTO: 90 FL (ref 82.6–102.9)
MONOCYTES NFR BLD: 0.5 K/UL (ref 0.1–1.2)
MONOCYTES NFR BLD: 10 % (ref 3–12)
NEUTROPHILS NFR BLD: 67 % (ref 36–65)
NEUTS SEG NFR BLD: 3.41 K/UL (ref 1.5–8.1)
NRBC BLD-RTO: 0 PER 100 WBC
PLATELET # BLD AUTO: 199 K/UL (ref 138–453)
PMV BLD AUTO: 11.4 FL (ref 8.1–13.5)
POTASSIUM SERPL-SCNC: 4.2 MMOL/L (ref 3.7–5.3)
PROT SERPL-MCNC: 6.9 G/DL (ref 6.6–8.7)
RBC # BLD AUTO: 4.28 M/UL (ref 3.95–5.11)
SODIUM SERPL-SCNC: 142 MMOL/L (ref 136–145)
TROPONIN I SERPL HS-MCNC: 7 NG/L (ref 0–14)
TROPONIN I SERPL HS-MCNC: 8 NG/L (ref 0–14)
TSH SERPL DL<=0.05 MIU/L-ACNC: 0.94 UIU/ML (ref 0.27–4.2)
WBC OTHER # BLD: 5.1 K/UL (ref 3.5–11.3)

## 2024-05-30 PROCEDURE — G0378 HOSPITAL OBSERVATION PER HR: HCPCS

## 2024-05-30 PROCEDURE — 85025 COMPLETE CBC W/AUTO DIFF WBC: CPT

## 2024-05-30 PROCEDURE — 2580000003 HC RX 258: Performed by: STUDENT IN AN ORGANIZED HEALTH CARE EDUCATION/TRAINING PROGRAM

## 2024-05-30 PROCEDURE — 83880 ASSAY OF NATRIURETIC PEPTIDE: CPT

## 2024-05-30 PROCEDURE — 93005 ELECTROCARDIOGRAM TRACING: CPT | Performed by: EMERGENCY MEDICINE

## 2024-05-30 PROCEDURE — 84443 ASSAY THYROID STIM HORMONE: CPT

## 2024-05-30 PROCEDURE — 80053 COMPREHEN METABOLIC PANEL: CPT

## 2024-05-30 PROCEDURE — 71046 X-RAY EXAM CHEST 2 VIEWS: CPT

## 2024-05-30 PROCEDURE — 99285 EMERGENCY DEPT VISIT HI MDM: CPT

## 2024-05-30 PROCEDURE — 84484 ASSAY OF TROPONIN QUANT: CPT

## 2024-05-30 RX ORDER — POLYETHYLENE GLYCOL 3350 17 G/17G
17 POWDER, FOR SOLUTION ORAL DAILY PRN
Status: DISCONTINUED | OUTPATIENT
Start: 2024-05-30 | End: 2024-05-31 | Stop reason: HOSPADM

## 2024-05-30 RX ORDER — ACETAMINOPHEN 650 MG/1
650 SUPPOSITORY RECTAL EVERY 6 HOURS PRN
Status: DISCONTINUED | OUTPATIENT
Start: 2024-05-30 | End: 2024-05-31 | Stop reason: HOSPADM

## 2024-05-30 RX ORDER — ENOXAPARIN SODIUM 100 MG/ML
40 INJECTION SUBCUTANEOUS DAILY
Status: DISCONTINUED | OUTPATIENT
Start: 2024-05-31 | End: 2024-05-31 | Stop reason: HOSPADM

## 2024-05-30 RX ORDER — ONDANSETRON 4 MG/1
4 TABLET, ORALLY DISINTEGRATING ORAL EVERY 8 HOURS PRN
Status: DISCONTINUED | OUTPATIENT
Start: 2024-05-30 | End: 2024-05-31 | Stop reason: HOSPADM

## 2024-05-30 RX ORDER — ACETAMINOPHEN 325 MG/1
650 TABLET ORAL EVERY 6 HOURS PRN
Status: DISCONTINUED | OUTPATIENT
Start: 2024-05-30 | End: 2024-05-31 | Stop reason: HOSPADM

## 2024-05-30 RX ORDER — SODIUM CHLORIDE 0.9 % (FLUSH) 0.9 %
5-40 SYRINGE (ML) INJECTION PRN
Status: DISCONTINUED | OUTPATIENT
Start: 2024-05-30 | End: 2024-05-31 | Stop reason: HOSPADM

## 2024-05-30 RX ORDER — ONDANSETRON 2 MG/ML
4 INJECTION INTRAMUSCULAR; INTRAVENOUS EVERY 6 HOURS PRN
Status: DISCONTINUED | OUTPATIENT
Start: 2024-05-30 | End: 2024-05-31 | Stop reason: HOSPADM

## 2024-05-30 RX ORDER — SODIUM CHLORIDE 9 MG/ML
INJECTION, SOLUTION INTRAVENOUS PRN
Status: DISCONTINUED | OUTPATIENT
Start: 2024-05-30 | End: 2024-05-31 | Stop reason: HOSPADM

## 2024-05-30 RX ORDER — SODIUM CHLORIDE 0.9 % (FLUSH) 0.9 %
5-40 SYRINGE (ML) INJECTION EVERY 12 HOURS SCHEDULED
Status: DISCONTINUED | OUTPATIENT
Start: 2024-05-30 | End: 2024-05-31 | Stop reason: HOSPADM

## 2024-05-30 RX ADMIN — SODIUM CHLORIDE, PRESERVATIVE FREE 10 ML: 5 INJECTION INTRAVENOUS at 20:21

## 2024-05-30 ASSESSMENT — ENCOUNTER SYMPTOMS
SHORTNESS OF BREATH: 0
BACK PAIN: 0
DIARRHEA: 0
ABDOMINAL PAIN: 0
COUGH: 0
CONSTIPATION: 0

## 2024-05-30 ASSESSMENT — HEART SCORE: ECG: NORMAL

## 2024-05-30 NOTE — ED NOTES
Labeled blood specimens sent to lab via tube system.    [x] Lavender   [] on ice   [x] Blue   [x] Green/yellow  [x] Green/black [] on ice  [] Pink  [] Red  [] Yellow  [] on ice  [] Blood Cultures  [] x1 [] x2

## 2024-05-30 NOTE — ED NOTES
ED to inpatient nurses report      Chief Complaint:  Chief Complaint   Patient presents with    Chest Pain     Present to ED from: Home     MOA:     LOC: alert and orientated to name, place, date  Mobility: Independent  Oxygen Baseline: RA    Current needs required: RA   Pending ED orders: None   Present condition: Stable     Why did the patient come to the ED? Chest pressure, heart palpitations, increased fatigue   What is the plan? Admit to OBS for chest pain   Any procedures or intervention occur? Labs, Xray  Any safety concerns?? None    Mental Status:       Psych Assessment:   Psychosocial  Psychosocial (WDL): Within Defined Limits  Vital signs   Vitals:    05/30/24 1156 05/30/24 1158 05/30/24 1204 05/30/24 1317   BP: (!) 188/82   138/73   Pulse: 71 67  59   Resp: 14 16  17   Temp:   97.9 °F (36.6 °C)    TempSrc:   Oral    SpO2:  98%  96%        Vitals:  Patient Vitals for the past 24 hrs:   BP Temp Temp src Pulse Resp SpO2   05/30/24 1317 138/73 -- -- 59 17 96 %   05/30/24 1204 -- 97.9 °F (36.6 °C) Oral -- -- --   05/30/24 1158 -- -- -- 67 16 98 %   05/30/24 1156 (!) 188/82 -- -- 71 14 --      Visit Vitals  /73   Pulse 59   Temp 97.9 °F (36.6 °C) (Oral)   Resp 17   SpO2 96%        LDAs:   Peripheral IV 05/30/24 Proximal;Right;Anterior Forearm (Active)   Site Assessment Clean, dry & intact 05/30/24 1222   Line Status Blood return noted 05/30/24 1222       Ambulatory Status:  Presents to emergency department  because of falls (Syncope, seizure, or loss of consciousness): No, Age > 70: No, Altered Mental Status, Intoxication with alcohol or substance confusion (Disorientation, impaired judgment, poor safety awaremess, or inability to follow instructions): No, Impaired Mobility: Ambulates or transfers with assistive devices or assistance; Unable to ambulate or transer.: No, Nursing Judgement: No    Diagnosis:  DISPOSITION Admitted 05/30/2024 02:04:12 PM   Final diagnoses:   Chest pain, unspecified type         Consults:  IP CONSULT TO CARDIOLOGY     Treatment Team:   Treatment Team: Attending Provider: Rubens Villarreal MD; Registered Nurse: Valentin Dc RN; Resident: Jessy Garcia MD    Treatment:  ED Course as of 24 1411   Thu May 30, 2024   1249 XR CHEST (2 VW)  IMPRESSION:  No acute process.      [HO]   1249 Troponin, High Sensitivity: 8 [HO]   1359 Troponin, High Sensitivity: 7 [HO]   1401 Spoke with patient and advised observation stay and she is amenable at this time. [HO]   1403 History: 2  EC  Patient Age: 1  *Risk factors for Atherosclerotic disease: Obesity  Risk Factors: 1  Troponin: 0  Heart Score Total: 5  [HO]   1403 Will admit to observation for Cardiologic evaluation. [HO]      ED Course User Index  [HO] Jessy Garcia MD          Skin Assessment:        Pain Score:         SOCIAL HISTORY       Social History     Socioeconomic History    Marital status:    Tobacco Use    Smoking status: Never    Smokeless tobacco: Never   Substance and Sexual Activity    Alcohol use: Yes     Alcohol/week: 6.0 standard drinks of alcohol     Types: 6 Standard drinks or equivalent per week    Drug use: No    Sexual activity: Yes     Partners: Male     Social Determinants of Health     Financial Resource Strain: Low Risk  (2022)    Overall Financial Resource Strain (CARDIA)     Difficulty of Paying Living Expenses: Not hard at all   Food Insecurity: No Food Insecurity (2022)    Hunger Vital Sign     Worried About Running Out of Food in the Last Year: Never true     Ran Out of Food in the Last Year: Never true       FAMILY HISTORY       Family History   Problem Relation Age of Onset    Heart Disease Mother     Lupus Mother     Lung Cancer Father     Hypertension Father     Emphysema Father     Lupus Sister     Hypertension Brother     COPD Brother     Diabetes Brother     Stroke Maternal Grandmother        ALLERGIES     Demerol hcl [meperidine] and Tetracyclines & related    CURRENT

## 2024-05-30 NOTE — ED NOTES
Pt arrives alert and oriented x4 and ambulatory from triage   Pt complains of chest pressure, and palpitations   Pt states hx of mitral prolapse, and states she hasn't seen her cardiologist since 2020   Pt states when she gets palpitations, she gets lightheaded and dizziness, and then super fatigued and has to sit down d/t SOB   Pt denies any episodes of LOC   Pt placed on cardiac monitor, continuous pulse ox, and BP cuff.  RR even and unlabored.   NAD noted.   Whiteboard updated.  Will continue with plan of care.

## 2024-05-30 NOTE — H&P
glasses.    I have reviewed the past medical history with the patient and it is pertinent to this complaint.      SURGICAL HISTORY      has a past surgical history that includes  section; vaginoplasty; and Tonsillectomy and adenoidectomy.  I have reviewed and agree with Surgical History entered and it is pertinent to this complaint.     CURRENT MEDICATIONS     sodium chloride flush 0.9 % injection 5-40 mL, 2 times per day  sodium chloride flush 0.9 % injection 5-40 mL, PRN  0.9 % sodium chloride infusion, PRN  [START ON 2024] enoxaparin (LOVENOX) injection 40 mg, Daily  ondansetron (ZOFRAN-ODT) disintegrating tablet 4 mg, Q8H PRN   Or  ondansetron (ZOFRAN) injection 4 mg, Q6H PRN  polyethylene glycol (GLYCOLAX) packet 17 g, Daily PRN  acetaminophen (TYLENOL) tablet 650 mg, Q6H PRN   Or  acetaminophen (TYLENOL) suppository 650 mg, Q6H PRN        All medication charted and reviewed.    ALLERGIES     is allergic to demerol hcl [meperidine] and tetracyclines & related.      FAMILY HISTORY     She indicated that her mother is . She indicated that her father is . She indicated that all of her three sisters are alive. She indicated that her brother is alive. She indicated that the status of her maternal grandmother is unknown.     family history includes COPD in her brother; Diabetes in her brother; Emphysema in her father; Heart Disease in her mother; Hypertension in her brother and father; Lung Cancer in her father; Lupus in her mother and sister; Stroke in her maternal grandmother.  The patient denies any pertinent family history.  I have reviewed and agree with the family history entered.  I have reviewed the Family History and it is not significant to the case    SOCIAL HISTORY      reports that she has never smoked. She has never used smokeless tobacco. She reports current alcohol use of about 6.0 standard drinks of alcohol per week. She reports that she does not use drugs.  I have  (*)     Lymphocytes % 19 (*)     Lymphocytes Absolute 0.97 (*)     All other components within normal limits   COMPREHENSIVE METABOLIC PANEL - Abnormal; Notable for the following components:    Chloride 108 (*)     Calcium 10.5 (*)     All other components within normal limits   TROPONIN   TROPONIN   BRAIN NATRIURETIC PEPTIDE   TSH WITH REFLEX       SCREENING TOOLS:    HEART Risk Score for Chest Pain Patients  History and Physical Exam Suspicion Level  (Nausea, Vomiting, Diaphoresis, Radiation, Exertion)  Slightly Suspicious (0 pts)  Moderately Suspicious (1 pt)  Highly Suspicious (2 pts)  EKG Interpretation  Normal (0 pts)  Non-Specific Repolarization Disturbance (1 pt)  Significant ST-Depression (2 pts)  Age of Patient (in years)  = 45 (0 pts)  46-64 (1 pt)  = 65 (2 pts)  Risk Factors  No Risk Factors (0 pts)  1-2 Risk Factors (1 pt)  = 3 Risk Factors (2 pts)  Risk Factors Include:  Hypercholesterolemia  Hypertension  Diabetes Mellitus  Cigarette smoking  Positive family history  Obesity  CAD  (SLE, CKDz, HIV, Cocaine abuse)  Troponin Levels  = Normal Limit (0 pts)  1-3 Times Normal Limit (1 pt)  > 3 Times Normal Limit (2 pts)  TOTAL:    Percent Risk for Major Adverse Cardiac Event (MACE)  0-3 pts indicates low risk for MACE   2.5% (DISCHARGE)   4-7 pts indicates moderate risk for MACE  20.3% (OBS)  8-10 pts indicates high risk for MACE  72.7% (EARLY INVASIVE TX)    CDU IMPRESSION / PLAN      Jamila Gustafson is a 64 y.o. female who presents with complaints of intermittent episodes of chest pressure worsening with exertion.  Patient also has some complaints of episodes of palpitation, nausea and lightheadedness.  She states that she has a history of mitral valve prolapse and has not followed with cardiology in approximately 4 years.  Given past medical history and clinical presentation will admit for further observation and cardiac evaluation.    Inpatient consult cardiology-appreciate recommendations  Symptom

## 2024-05-30 NOTE — ED PROVIDER NOTES
[facility]     Emergency Department     Faculty Attestation    I performed a history and physical examination of the patient and discussed management with the resident. I reviewed the resident’s note and agree with the documented findings and plan of care. Any areas of disagreement are noted on the chart. I was personally present for the key portions of any procedures. I have documented in the chart those procedures where I was not present during the key portions. I have reviewed the emergency nurses triage note. I agree with the chief complaint, past medical history, past surgical history, allergies, medications, social and family history as documented unless otherwise noted below.        For Physician Assistant/ Nurse Practitioner cases/documentation I have personally evaluated this patient and have completed at least one if not all key elements of the E/M (history, physical exam, and MDM). Additional findings are as noted.  I have personally seen and evaluated the patient.  I find the patient's history and physical exam are consistent with the NP/PA documentation.  I agree with the care provided, treatment rendered, disposition and follow-up plan.    Describes chest pain and intermittent palpitations ongoing for several months and now worsening primarily with exertion has felt generalized fatigue as well.  Current vital signs are stable      EKG Interpretation    Interpreted by emergency department physician    Rhythm: normal sinus   Rate: normal  Axis: normal  Conduction: normal  ST Segments: no acute change  T Waves: no acute change  Q Waves: no acute change    Clinical Impression:  nonspecific EKG.          Critical Care     Rubens White M.D.  Attending Emergency  Physician            Rubens White MD  05/30/24 [id number]

## 2024-05-30 NOTE — ED PROVIDER NOTES
[facility] ED  Emergency Department Encounter  Emergency Medicine Resident     Pt Name:Jamila Gustafson  MRN: [id number]  Birthdate 1960  Date of evaluation: 24  PCP:  Jasmin Stanley MD  Note Started: 12:10 PM EDT      CHIEF COMPLAINT       Chief Complaint   Patient presents with    Chest Pain       HISTORY OF PRESENT ILLNESS  (Location/Symptom, Timing/Onset, Context/Setting, Quality, Duration, Modifying Factors, Severity.)      Jamila Gustafson is a 64 y.o. female who presents with chest pain.  Patient reports that she has been having ongoing chest pressure that is worse with exertion that coincides with palpitations and nausea and sensation of lightheadedness.  She reports that these episodes have been more persistent over the past week but has been ongoing for the past several months.  She reports that she is unable to see her cardiologist until August and is concerned.  She reports currently she has some mild pressure but otherwise denies current palpitations, diaphoresis, nausea, vomiting or lightheadedness.  She reports that she is on no medications daily but does have a history of MVP for which she has had no operative repair.  She denies any swelling of the legs or recent travel.  She reports occasionally when she has these episodes of chest pressure with exertion it seems to radiate to her left side of her jaw.    PAST MEDICAL / SURGICAL / SOCIAL / FAMILY HISTORY      has a past medical history of Dermatitis, MVP (mitral valve prolapse), and Wears glasses.     has a past surgical history that includes  section; vaginoplasty; and Tonsillectomy and adenoidectomy.    Social History     Socioeconomic History    Marital status:      Spouse name: Not on file    Number of children: Not on file    Years of education: Not on file    Highest education level: Not on file   Occupational History    Not on file   Tobacco Use    Smoking status: Never    Smokeless tobacco: Never    Substance and Sexual Activity    Alcohol use: Yes     Alcohol/week: 6.0 standard drinks of alcohol     Types: 6 Standard drinks or equivalent per week    Drug use: No    Sexual activity: Yes     Partners: Male   Other Topics Concern    Not on file   Social History Narrative    Not on file     Social Determinants of Health     Financial Resource Strain: Low Risk  (7/12/2022)    Overall Financial Resource Strain (CARDIA)     Difficulty of Paying Living Expenses: Not hard at all   Food Insecurity: No Food Insecurity (7/12/2022)    Hunger Vital Sign     Worried About Running Out of Food in the Last Year: Never true     Ran Out of Food in the Last Year: Never true   Transportation Needs: Not on file   Physical Activity: Not on file   Stress: Not on file   Social Connections: Not on file   Intimate Partner Violence: Not on file   Housing Stability: Not on file       Family History   Problem Relation Age of Onset    Heart Disease Mother     Lupus Mother     Lung Cancer Father     Hypertension Father     Emphysema Father     Lupus Sister     Hypertension Brother     COPD Brother     Diabetes Brother     Stroke Maternal Grandmother        Allergies:  Demerol hcl [meperidine] and Tetracyclines & related    Home Medications:  Prior to Admission medications    Not on File       REVIEW OF SYSTEMS       See HPI    PHYSICAL EXAM      INITIAL VITALS:   /77   Pulse 58   Temp 97.9 °F (36.6 °C) (Oral)   Resp 13   LMP 01/01/1999 (Approximate)   SpO2 100%     Physical Exam      DDX/DIAGNOSTIC RESULTS / EMERGENCY DEPARTMENT COURSE / MDM     Medical Decision Making  Patient is a 64-year-old female presenting due to episodes of chest pain, palpitations, lightheadedness and nausea with pain radiating to the jaw.  Currently patient reports only chest pressure that is somewhat mild.  She upon examination is satting well on room air, afebrile, nontachycardic with sinus rhythm and blood pressure within normal range.  Differential

## 2024-05-30 NOTE — ED NOTES
Labeled blood specimens sent to lab via tube system.    [] Lavender   [] on ice   [] Blue   [x] Green/yellow  [] Green/black [] on ice  [] Pink  [] Red  [] Yellow  [] on ice  [] Blood Cultures  [] x1 [] x2

## 2024-05-30 NOTE — CARE COORDINATION
DISCHARGE PLANNING EVALUATION: OP/OBSERVATION        5/30/24, 2:33 PM EDT    Jamila Gustafson         Location: 19/19   Reason for hospitalization: Chest pain [R07.9]     CM Services requested for transitional needs.     PCP: Jasmin Stanley MD    Transportation/Food Security/Housekeeping Addressed:  No issues identified.     Equipment needs: none identified     Case Management Services Information Letter Provided [x]    Transition plan: Obs: home independently

## 2024-05-31 ENCOUNTER — APPOINTMENT (OUTPATIENT)
Age: 64
End: 2024-05-31
Attending: INTERNAL MEDICINE
Payer: MEDICARE

## 2024-05-31 ENCOUNTER — APPOINTMENT (OUTPATIENT)
Dept: NUCLEAR MEDICINE | Age: 64
End: 2024-05-31
Payer: MEDICARE

## 2024-05-31 ENCOUNTER — APPOINTMENT (OUTPATIENT)
Age: 64
End: 2024-05-31
Payer: MEDICARE

## 2024-05-31 VITALS
OXYGEN SATURATION: 97 % | TEMPERATURE: 98.2 F | DIASTOLIC BLOOD PRESSURE: 67 MMHG | HEART RATE: 64 BPM | SYSTOLIC BLOOD PRESSURE: 122 MMHG | HEIGHT: 62 IN | WEIGHT: 160 LBS | BODY MASS INDEX: 29.44 KG/M2 | RESPIRATION RATE: 18 BRPM

## 2024-05-31 LAB
ECHO AO ROOT DIAM: 2.8 CM
ECHO AO ROOT INDEX: 1.62 CM/M2
ECHO AR MAX VEL PISA: 5 M/S
ECHO AV MEAN GRADIENT: 7 MMHG
ECHO AV MEAN VELOCITY: 1.1 M/S
ECHO AV PEAK GRADIENT: 15 MMHG
ECHO AV PEAK VELOCITY: 2 M/S
ECHO AV REGURGITANT PHT: 592 MS
ECHO AV VELOCITY RATIO: 0.55
ECHO AV VENA CONTRACTA AREA: 0.4 CM2
ECHO AV VENA CONTRACTA: 0.4 CM
ECHO AV VTI: 38.2 CM
ECHO BSA: 1.78 M2
ECHO BSA: 1.78 M2
ECHO EST RA PRESSURE: 8 MMHG
ECHO IVC PROX: 1.7 CM
ECHO LA AREA 2C: 18.1 CM2
ECHO LA AREA 4C: 16.2 CM2
ECHO LA DIAMETER INDEX: 2.14 CM/M2
ECHO LA DIAMETER: 3.7 CM
ECHO LA MAJOR AXIS: 5.2 CM
ECHO LA MINOR AXIS: 5.2 CM
ECHO LA TO AORTIC ROOT RATIO: 1.32
ECHO LA VOL BP: 45 ML (ref 22–52)
ECHO LA VOL MOD A2C: 51 ML (ref 22–52)
ECHO LA VOL MOD A4C: 42 ML (ref 22–52)
ECHO LA VOL/BSA BIPLANE: 26 ML/M2 (ref 16–34)
ECHO LA VOLUME INDEX MOD A2C: 29 ML/M2 (ref 16–34)
ECHO LA VOLUME INDEX MOD A4C: 24 ML/M2 (ref 16–34)
ECHO LV E' LATERAL VELOCITY: 10 CM/S
ECHO LV E' SEPTAL VELOCITY: 8 CM/S
ECHO LV EDV A2C: 46 ML
ECHO LV EDV A4C: 78 ML
ECHO LV EDV INDEX A4C: 45 ML/M2
ECHO LV EDV NDEX A2C: 27 ML/M2
ECHO LV EJECTION FRACTION A2C: 51 %
ECHO LV EJECTION FRACTION A4C: 65 %
ECHO LV EJECTION FRACTION BIPLANE: 59 % (ref 55–100)
ECHO LV ESV A2C: 23 ML
ECHO LV ESV A4C: 28 ML
ECHO LV ESV INDEX A2C: 13 ML/M2
ECHO LV ESV INDEX A4C: 16 ML/M2
ECHO LV INTERNAL DIMENSION DIASTOLE INDEX: 2.66 CM/M2
ECHO LV INTERNAL DIMENSION DIASTOLIC: 4.6 CM (ref 3.9–5.3)
ECHO LV IVSD: 1 CM (ref 0.6–0.9)
ECHO LV MASS 2D: 192.9 G (ref 67–162)
ECHO LV MASS INDEX 2D: 111.5 G/M2 (ref 43–95)
ECHO LV POSTERIOR WALL DIASTOLIC: 1.3 CM (ref 0.6–0.9)
ECHO LV RELATIVE WALL THICKNESS RATIO: 0.57
ECHO LVOT AV VTI INDEX: 0.66
ECHO LVOT MEAN GRADIENT: 2 MMHG
ECHO LVOT PEAK GRADIENT: 5 MMHG
ECHO LVOT PEAK VELOCITY: 1.1 M/S
ECHO LVOT VTI: 25.4 CM
ECHO MV A VELOCITY: 0.78 M/S
ECHO MV E DECELERATION TIME (DT): 182 MS
ECHO MV E VELOCITY: 0.64 M/S
ECHO MV E/A RATIO: 0.82
ECHO MV E/E' LATERAL: 6.4
ECHO MV E/E' RATIO (AVERAGED): 7.2
ECHO MV E/E' SEPTAL: 8
ECHO MV LVOT VTI INDEX: 1.27
ECHO MV MAX VELOCITY: 1.6 M/S
ECHO MV MEAN GRADIENT: 2 MMHG
ECHO MV MEAN VELOCITY: 0.5 M/S
ECHO MV PEAK GRADIENT: 10 MMHG
ECHO MV VTI: 32.2 CM
ECHO PULMONARY ARTERY END DIASTOLIC PRESSURE: 3 MMHG
ECHO PV MAX VELOCITY: 0.9 M/S
ECHO PV PEAK GRADIENT: 3 MMHG
ECHO PV REGURGITANT MAX VELOCITY: 0.9 M/S
ECHO RIGHT VENTRICULAR SYSTOLIC PRESSURE (RVSP): 33 MMHG
ECHO RV BASAL DIMENSION: 3.1 CM
ECHO RV INTERNAL DIMENSION: 2.7 CM
ECHO RV MID DIMENSION: 2.8 CM
ECHO RV TAPSE: 2.6 CM (ref 1.7–?)
ECHO TV REGURGITANT MAX VELOCITY: 2.51 M/S
ECHO TV REGURGITANT PEAK GRADIENT: 25 MMHG
EKG ATRIAL RATE: 60 BPM
EKG ATRIAL RATE: 62 BPM
EKG P AXIS: -23 DEGREES
EKG P AXIS: 6 DEGREES
EKG P-R INTERVAL: 144 MS
EKG P-R INTERVAL: 156 MS
EKG Q-T INTERVAL: 380 MS
EKG Q-T INTERVAL: 400 MS
EKG QRS DURATION: 70 MS
EKG QRS DURATION: 80 MS
EKG QTC CALCULATION (BAZETT): 385 MS
EKG QTC CALCULATION (BAZETT): 400 MS
EKG R AXIS: 44 DEGREES
EKG T AXIS: 10 DEGREES
EKG T AXIS: 34 DEGREES
EKG VENTRICULAR RATE: 60 BPM
EKG VENTRICULAR RATE: 62 BPM
NUC STRESS EJECTION FRACTION: 70 %
STRESS BASELINE DIAS BP: 82 MMHG
STRESS BASELINE HR: 64 BPM
STRESS BASELINE SYS BP: 144 MMHG
STRESS ESTIMATED WORKLOAD: 1 METS
STRESS PEAK DIAS BP: 74 MMHG
STRESS PEAK SYS BP: 177 MMHG
STRESS PERCENT HR ACHIEVED: 67 %
STRESS POST PEAK HR: 104 BPM
STRESS RATE PRESSURE PRODUCT: NORMAL BPM*MMHG
STRESS TARGET HR: 155 BPM
TID: 1.11

## 2024-05-31 PROCEDURE — 99223 1ST HOSP IP/OBS HIGH 75: CPT | Performed by: INTERNAL MEDICINE

## 2024-05-31 PROCEDURE — 93306 TTE W/DOPPLER COMPLETE: CPT | Performed by: INTERNAL MEDICINE

## 2024-05-31 PROCEDURE — 93018 CV STRESS TEST I&R ONLY: CPT | Performed by: RADIOLOGY

## 2024-05-31 PROCEDURE — A9500 TC99M SESTAMIBI: HCPCS | Performed by: INTERNAL MEDICINE

## 2024-05-31 PROCEDURE — G0378 HOSPITAL OBSERVATION PER HR: HCPCS

## 2024-05-31 PROCEDURE — 3430000000 HC RX DIAGNOSTIC RADIOPHARMACEUTICAL: Performed by: INTERNAL MEDICINE

## 2024-05-31 PROCEDURE — 78452 HT MUSCLE IMAGE SPECT MULT: CPT

## 2024-05-31 PROCEDURE — 93306 TTE W/DOPPLER COMPLETE: CPT

## 2024-05-31 PROCEDURE — 93017 CV STRESS TEST TRACING ONLY: CPT

## 2024-05-31 PROCEDURE — 2580000003 HC RX 258: Performed by: INTERNAL MEDICINE

## 2024-05-31 PROCEDURE — 93016 CV STRESS TEST SUPVJ ONLY: CPT | Performed by: RADIOLOGY

## 2024-05-31 PROCEDURE — 2580000003 HC RX 258: Performed by: STUDENT IN AN ORGANIZED HEALTH CARE EDUCATION/TRAINING PROGRAM

## 2024-05-31 PROCEDURE — 93005 ELECTROCARDIOGRAM TRACING: CPT | Performed by: STUDENT IN AN ORGANIZED HEALTH CARE EDUCATION/TRAINING PROGRAM

## 2024-05-31 PROCEDURE — 6360000002 HC RX W HCPCS: Performed by: INTERNAL MEDICINE

## 2024-05-31 RX ORDER — TETRAKIS(2-METHOXYISOBUTYLISOCYANIDE)COPPER(I) TETRAFLUOROBORATE 1 MG/ML
46 INJECTION, POWDER, LYOPHILIZED, FOR SOLUTION INTRAVENOUS
Status: COMPLETED | OUTPATIENT
Start: 2024-05-31 | End: 2024-05-31

## 2024-05-31 RX ORDER — SODIUM CHLORIDE 0.9 % (FLUSH) 0.9 %
10 SYRINGE (ML) INJECTION PRN
Status: DISCONTINUED | OUTPATIENT
Start: 2024-05-31 | End: 2024-05-31 | Stop reason: HOSPADM

## 2024-05-31 RX ORDER — SODIUM CHLORIDE 9 MG/ML
500 INJECTION, SOLUTION INTRAVENOUS CONTINUOUS PRN
Status: DISCONTINUED | OUTPATIENT
Start: 2024-05-31 | End: 2024-05-31 | Stop reason: ALTCHOICE

## 2024-05-31 RX ORDER — ALBUTEROL SULFATE 90 UG/1
2 AEROSOL, METERED RESPIRATORY (INHALATION) PRN
Status: DISCONTINUED | OUTPATIENT
Start: 2024-05-31 | End: 2024-05-31 | Stop reason: ALTCHOICE

## 2024-05-31 RX ORDER — TETRAKIS(2-METHOXYISOBUTYLISOCYANIDE)COPPER(I) TETRAFLUOROBORATE 1 MG/ML
13.6 INJECTION, POWDER, LYOPHILIZED, FOR SOLUTION INTRAVENOUS
Status: COMPLETED | OUTPATIENT
Start: 2024-05-31 | End: 2024-05-31

## 2024-05-31 RX ORDER — SODIUM CHLORIDE 0.9 % (FLUSH) 0.9 %
5-40 SYRINGE (ML) INJECTION PRN
Status: DISCONTINUED | OUTPATIENT
Start: 2024-05-31 | End: 2024-05-31 | Stop reason: ALTCHOICE

## 2024-05-31 RX ORDER — NITROGLYCERIN 0.4 MG/1
0.4 TABLET SUBLINGUAL EVERY 5 MIN PRN
Status: DISCONTINUED | OUTPATIENT
Start: 2024-05-31 | End: 2024-05-31 | Stop reason: ALTCHOICE

## 2024-05-31 RX ORDER — ATROPINE SULFATE 0.1 MG/ML
0.5 INJECTION INTRAVENOUS EVERY 5 MIN PRN
Status: DISCONTINUED | OUTPATIENT
Start: 2024-05-31 | End: 2024-05-31 | Stop reason: ALTCHOICE

## 2024-05-31 RX ORDER — AMINOPHYLLINE 25 MG/ML
50 INJECTION, SOLUTION INTRAVENOUS PRN
Status: DISCONTINUED | OUTPATIENT
Start: 2024-05-31 | End: 2024-05-31 | Stop reason: ALTCHOICE

## 2024-05-31 RX ORDER — METOPROLOL TARTRATE 1 MG/ML
5 INJECTION, SOLUTION INTRAVENOUS EVERY 5 MIN PRN
Status: DISCONTINUED | OUTPATIENT
Start: 2024-05-31 | End: 2024-05-31 | Stop reason: ALTCHOICE

## 2024-05-31 RX ORDER — REGADENOSON 0.08 MG/ML
0.4 INJECTION, SOLUTION INTRAVENOUS
Status: COMPLETED | OUTPATIENT
Start: 2024-05-31 | End: 2024-05-31

## 2024-05-31 RX ADMIN — TETRAKIS(2-METHOXYISOBUTYLISOCYANIDE)COPPER(I) TETRAFLUOROBORATE 13.6 MILLICURIE: 1 INJECTION, POWDER, LYOPHILIZED, FOR SOLUTION INTRAVENOUS at 11:47

## 2024-05-31 RX ADMIN — SODIUM CHLORIDE, PRESERVATIVE FREE 10 ML: 5 INJECTION INTRAVENOUS at 11:47

## 2024-05-31 RX ADMIN — REGADENOSON 0.4 MG: 0.08 INJECTION, SOLUTION INTRAVENOUS at 11:46

## 2024-05-31 RX ADMIN — SODIUM CHLORIDE, PRESERVATIVE FREE 10 ML: 5 INJECTION INTRAVENOUS at 13:30

## 2024-05-31 RX ADMIN — SODIUM CHLORIDE, PRESERVATIVE FREE 10 ML: 5 INJECTION INTRAVENOUS at 08:19

## 2024-05-31 RX ADMIN — SODIUM CHLORIDE, PRESERVATIVE FREE 10 ML: 5 INJECTION INTRAVENOUS at 11:41

## 2024-05-31 RX ADMIN — TETRAKIS(2-METHOXYISOBUTYLISOCYANIDE)COPPER(I) TETRAFLUOROBORATE 46 MILLICURIE: 1 INJECTION, POWDER, LYOPHILIZED, FOR SOLUTION INTRAVENOUS at 13:30

## 2024-05-31 NOTE — DISCHARGE INSTRUCTIONS
Your workup from the emergency department did not show any significant pathology but you were admitted to the observation unit for ongoing evaluation.    Your testing included lab work, stress test, echocardiogram.  Your stress test resulted as low risk (Less than 1% annual death or MI ).     You have been given the contact information for [facility].  Recommend you call them for follow-up appointment, reevaluation.    Please also follow-up with your primary care doctor for outpatient medication management.    An outpatient Holter monitor has also been ordered.  Please  at your convenience.    We no longer need to keep you in the hospital but that does not mean you are done being treated  -Take your prescribed medications as directed  -Follow-up with your primary care physician or the specialist designated or both as scheduled    Please return if your condition worsens or there are new symptoms    If there are concerns that have not been addressed while you have been in the hospital please let the discharge nurse know so the physician can be informed -it is easier to fix problems while you are still here    If you have questions after you have left the hospital please call the unit at  and ask for the observation resident on-call

## 2024-05-31 NOTE — PROGRESS NOTES
OBS/CDU   Progress NOTE      Patients PCP is:  Jasmin Stanley MD        SUBJECTIVE      No overnight events. Pt reports she feels well, slept well. No current complaints. Awaiting cardiology recommendations.     PHYSICAL EXAM      General: NAD, AO X 3  Heent: EMOI, PERRL  Neck: SUPPLE, NO JVD  Cardiovascular: RRR, S1S2  Pulmonary: CTAB, NO SOB  Abdomen: SOFT, NTTP, ND, +BS  Extremities: +2/4 PULSES DISTAL, NO SWELLING  Neuro / Psych: NO NUMBNESS OR TINGLING, MENTATION AT BASELINE    PERTINENT TEST /EXAMS      I have reviewed all available laboratory results.    MEDICATIONS CURRENT   sodium chloride flush 0.9 % injection 5-40 mL, 2 times per day  sodium chloride flush 0.9 % injection 5-40 mL, PRN  0.9 % sodium chloride infusion, PRN  enoxaparin (LOVENOX) injection 40 mg, Daily  ondansetron (ZOFRAN-ODT) disintegrating tablet 4 mg, Q8H PRN   Or  ondansetron (ZOFRAN) injection 4 mg, Q6H PRN  polyethylene glycol (GLYCOLAX) packet 17 g, Daily PRN  acetaminophen (TYLENOL) tablet 650 mg, Q6H PRN   Or  acetaminophen (TYLENOL) suppository 650 mg, Q6H PRN        All medication charted and reviewed.    CONSULTS      IP CONSULT TO CARDIOLOGY    ASSESSMENT/PLAN       Jamila Gustafson is a 64 y.o. female who presents with exertional chest pain, palpitations, lightheadedness, shortness of breath     -Most recent echo 2019, stress 2014  -Troponins 8 -> 7  -BNP WNL  -TSH WNL  -Cardiology consulted   -Stress pending   -Echo pending    Continue home medications and pain control  Monitor vitals, labs, and imaging  DISPO: pending consults and clinical improvement    --  Lisa Saenz, DO  Observation Physician     This dictation was generated by voice recognition computer software.  Although all attempts are made to edit the dictation for accuracy, there may be errors in the transcription that are not intended.

## 2024-05-31 NOTE — PROGRESS NOTES
CDU Daily Progress Note  Attending Physician       Pt Name: Jamila Gustafson  MRN: [id number]  Birthdate 1960  Date of evaluation: 5/31/24    I performed a history and physical examination of the patient and discussed management with the resident. I reviewed the resident’s note and agree with the documented findings and plan of care. Any areas of disagreement are noted on the chart. I was personally present for the key portions of any procedures. I have documented in the chart those procedures where I was not present during the key portions. I have reviewed the emergency nurses triage note. I agree with the chief complaint, past medical history, past surgical history, allergies, medications, social and family history as documented unless otherwise noted below. Documentation of the HPI, Physical Exam and Medical Decision Making performed by medical students or scribes is based on my personal performance of the HPI, PE and MDM. For Physician Assistant/ Nurse Practitioner cases/documentation I have personally evaluated this patient and have completed at least one if not all key elements of the E/M (history, physical exam, and MDM). Additional findings are as noted.    The Family History, Social History and Review of Systems are unchanged from the previous day. No significant events overnight.This patient was placed in the observation unit for reevaluation for possible admission to the hospital.     Stress and echo pending.  Appreciate cardiology recommendations.      Hailey Yusuf MD,    Attending Physician  Critical Decision Unit

## 2024-05-31 NOTE — CONSULTS
[facility]   Consult Note         Today's Date: 5/31/2024  Patient Name: Jamila Gustafson  Date of admission: 5/30/2024 11:51 AM  Patient's age: 64 y.o., 1960  Admission Dx: Chest pain [R07.9]  Chest pain, unspecified type [R07.9]    Reason for Consult:  Cardiac evaluation    Requesting Physician: Rubens Villarreal MD    REASON FOR CONSULT: Chest pain concerning for anginal symptoms    History Obtained From:  Patient, chart, staff, records    HISTORY OF PRESENT ILLNESS:      The patient is a 64 y.o. female who is admitted to the hospital on 5/30 for ongoing chest pressure that is diffusely in the center and worse with exertion and: Inside with her palpitations as well as occasional nausea and lightheadedness.  She has been having decreased exercise tolerance for approximately 2 months.  She has a history of mitral valve prolapse but was never told to get it fixed.  Her blood pressure is normally well-controlled at home around 120/70 and she does not take any medications for her.  She has not seen a cardiologist in 4 years.  Her next appointment is in August and she does not know if she can wait till then.  Her exercise tolerance has increased in the last few months from being able to walk 2 times around the track to starting in fall may be able to walk around once now only able to walk around the border of the track.  She gets more short of breath with exertion when gardening and doing basic cleaning around the house.  At the highest level of pain with extreme exertion it is 10 out of 10 with radiation to the jaw and arm.  She gets occasional palpitations with and without stress and chest pain.  These happen intermittently.    When patient was seen her blood pressure and vitals were normal.  She denied current: Lightheadedness, presyncope, chest pain, shortness of breath, orthopnea.    Had 2 teeth pulled in March and April requiring antibiotics each time.    Past Medical History:   has a past                 Chely Jimenez      Accession #  [id number]   Interpreting Physician      Herminio Boiwe      Fellow                   Referring Nurse                            Practitioner      Interpreting             Referring Physician         Marielle Bowman MD   Fellow     Type of Study      TTE procedure:2D Echocardiogram, M-Mode, Doppler, Color Doppler.     Procedure Date  Date: 01/06/2020 Start: 08:30 AM    Study Location: [facility]  Technical Quality: Adequate visualization    Indications:Mitral valve prolapse.    History / Tech. Comments:  MVP    Patient Status: Outpatient    Height: 62 inches Weight: 167 pounds BSA: 1.77 m^2 BMI: 30.54 kg/m^2    Rhythm: Within normal limits HR: 64 bpm    CONCLUSIONS    Summary  Left ventricle is normal in size and wall thickness.  Global left ventricular systolic function is normal. Estimated LV EF 60-65  %.  Left atrium is mildly dilated.  Mild aortic insufficiency.  Mild mitral regurgitation.  Mild tricuspid regurgitation.  Mild pulmonic insufficiency.  Estimated right ventricular systolic pressure is 26mmHg.    Signature  ----------------------------------------------------------------------------   Electronically signed by Chely Jimenez(Sonographer) on 01/06/2020 09:59   AM  ----------------------------------------------------------------------------    ----------------------------------------------------------------------------   Electronically signed by Herminio Bowie(Interpreting physician) on   01/06/2020 10:43 AM  ----------------------------------------------------------------------------  FINDINGS  Left Atrium  Left atrium is mildly dilated.  Left Ventricle  Left ventricle is normal in size and wall thickness.  Global left ventricular systolic function is normal.  Estimated LV EF 60-65 %.  Right Atrium  Right atrium is normal in size.  Right Ventricle  Normal right ventricular size and function.  Mitral Valve  No obvious valvular

## 2024-05-31 NOTE — DISCHARGE SUMMARY
CDU Discharge Summary        Patient:  Jamila Gustafson  YOB: 1960    MRN: [id number]   Acct: [id number]    Primary Care Physician: No primary care provider on file.    Admit date:  5/30/2024 11:51 AM  Discharge date: 5/31/2024    Discharge Diagnoses:     1.)  Patient had chest with acute on chronic onset due to unknown cause.  Treated with rest, pain medication.  Patient's symptoms are improved with the plan to discharge    Follow-up:  Call today/tomorrow for a follow up appointment with No primary care provider on file. , or return to the Emergency Room with worsening symptoms    Stressed to patient the importance of following up with primary care doctor for further workup/management of symptoms.  Pt verbalizes understanding and agrees with plan.    Discharge Medication Changes:       Medication List      You have not been prescribed any medications.         Diet:  ADULT DIET; Regular, advance as tolerated     Activity:  As tolerated    Consultants: IP CONSULT TO CARDIOLOGY    Procedures:  Not indicated     Diagnostic Test:   Results for orders placed or performed during the hospital encounter of 05/30/24   CBC with Auto Differential   Result Value Ref Range    WBC 5.1 3.5 - 11.3 k/uL    RBC 4.28 3.95 - 5.11 m/uL    Hemoglobin 12.8 11.9 - 15.1 g/dL    Hematocrit 38.5 36.3 - 47.1 %    MCV 90.0 82.6 - 102.9 fL    MCH 29.9 25.2 - 33.5 pg    MCHC 33.2 28.4 - 34.8 g/dL    RDW 13.1 11.8 - 14.4 %    Platelets 199 138 - 453 k/uL    MPV 11.4 8.1 - 13.5 fL    NRBC Automated 0.0 0.0 per 100 WBC    Neutrophils % 67 (H) 36 - 65 %    Lymphocytes % 19 (L) 24 - 43 %    Monocytes % 10 3 - 12 %    Eosinophils % 3 1 - 4 %    Basophils % 1 0 - 2 %    Immature Granulocytes % 0 0 %    Neutrophils Absolute 3.41 1.50 - 8.10 k/uL    Lymphocytes Absolute 0.97 (L) 1.10 - 3.70 k/uL    Monocytes Absolute 0.50 0.10 - 1.20 k/uL    Eosinophils Absolute 0.17 0.00 - 0.44 k/uL    Basophils Absolute 0.03 0.00 - 0.20 k/uL    Immature